# Patient Record
Sex: FEMALE
[De-identification: names, ages, dates, MRNs, and addresses within clinical notes are randomized per-mention and may not be internally consistent; named-entity substitution may affect disease eponyms.]

---

## 2018-03-12 ENCOUNTER — HOSPITAL ENCOUNTER (EMERGENCY)
Dept: HOSPITAL 88 - FSED | Age: 83
Discharge: HOME | End: 2018-03-12
Payer: COMMERCIAL

## 2018-03-12 VITALS — BODY MASS INDEX: 45.16 KG/M2 | WEIGHT: 230 LBS | HEIGHT: 60 IN

## 2018-03-12 VITALS — SYSTOLIC BLOOD PRESSURE: 145 MMHG | DIASTOLIC BLOOD PRESSURE: 75 MMHG

## 2018-03-12 DIAGNOSIS — D64.9: ICD-10-CM

## 2018-03-12 DIAGNOSIS — I10: ICD-10-CM

## 2018-03-12 DIAGNOSIS — J00: ICD-10-CM

## 2018-03-12 DIAGNOSIS — J45.901: ICD-10-CM

## 2018-03-12 DIAGNOSIS — R06.00: Primary | ICD-10-CM

## 2018-03-12 PROCEDURE — 87086 URINE CULTURE/COLONY COUNT: CPT

## 2018-03-12 PROCEDURE — 71046 X-RAY EXAM CHEST 2 VIEWS: CPT

## 2018-03-12 PROCEDURE — 99283 EMERGENCY DEPT VISIT LOW MDM: CPT

## 2018-03-12 PROCEDURE — 81003 URINALYSIS AUTO W/O SCOPE: CPT

## 2018-03-12 PROCEDURE — 84484 ASSAY OF TROPONIN QUANT: CPT

## 2018-03-12 PROCEDURE — 93005 ELECTROCARDIOGRAM TRACING: CPT

## 2018-03-12 PROCEDURE — 82553 CREATINE MB FRACTION: CPT

## 2018-03-12 PROCEDURE — 80053 COMPREHEN METABOLIC PANEL: CPT

## 2018-03-12 PROCEDURE — 83880 ASSAY OF NATRIURETIC PEPTIDE: CPT

## 2018-03-12 PROCEDURE — 85025 COMPLETE CBC W/AUTO DIFF WBC: CPT

## 2018-08-09 ENCOUNTER — HOSPITAL ENCOUNTER (EMERGENCY)
Dept: HOSPITAL 88 - FSED | Age: 83
Discharge: HOME | End: 2018-08-09
Payer: COMMERCIAL

## 2018-08-09 VITALS — BODY MASS INDEX: 30.13 KG/M2 | HEIGHT: 61 IN | WEIGHT: 159.56 LBS

## 2018-08-09 VITALS — DIASTOLIC BLOOD PRESSURE: 66 MMHG | SYSTOLIC BLOOD PRESSURE: 143 MMHG

## 2018-08-09 DIAGNOSIS — R06.00: Primary | ICD-10-CM

## 2018-08-09 DIAGNOSIS — I10: ICD-10-CM

## 2018-08-09 DIAGNOSIS — D50.9: ICD-10-CM

## 2018-08-09 DIAGNOSIS — J45.21: ICD-10-CM

## 2018-08-09 PROCEDURE — 94760 N-INVAS EAR/PLS OXIMETRY 1: CPT

## 2018-08-09 PROCEDURE — 85379 FIBRIN DEGRADATION QUANT: CPT

## 2018-08-09 PROCEDURE — 84484 ASSAY OF TROPONIN QUANT: CPT

## 2018-08-09 PROCEDURE — 71260 CT THORAX DX C+: CPT

## 2018-08-09 PROCEDURE — 82553 CREATINE MB FRACTION: CPT

## 2018-08-09 PROCEDURE — 99284 EMERGENCY DEPT VISIT MOD MDM: CPT

## 2018-08-09 PROCEDURE — 85025 COMPLETE CBC W/AUTO DIFF WBC: CPT

## 2018-08-09 PROCEDURE — 83880 ASSAY OF NATRIURETIC PEPTIDE: CPT

## 2018-08-09 PROCEDURE — 80053 COMPREHEN METABOLIC PANEL: CPT

## 2018-08-09 PROCEDURE — 93005 ELECTROCARDIOGRAM TRACING: CPT

## 2019-06-24 ENCOUNTER — HOSPITAL ENCOUNTER (EMERGENCY)
Dept: HOSPITAL 88 - FSED | Age: 84
Discharge: HOME | End: 2019-06-24
Payer: COMMERCIAL

## 2019-06-24 VITALS — HEIGHT: 61 IN | BODY MASS INDEX: 30.02 KG/M2 | WEIGHT: 159 LBS
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
## 2019-06-24 DIAGNOSIS — M25.512: ICD-10-CM

## 2019-06-24 DIAGNOSIS — M25.552: ICD-10-CM

## 2019-06-24 DIAGNOSIS — M79.622: Primary | ICD-10-CM

## 2019-06-24 DIAGNOSIS — I10: ICD-10-CM

## 2019-06-24 DIAGNOSIS — E78.5: ICD-10-CM

## 2019-06-24 DIAGNOSIS — M15.0: ICD-10-CM

## 2019-06-24 DIAGNOSIS — S42.352A: ICD-10-CM
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
## 2019-06-24 PROCEDURE — 99284 EMERGENCY DEPT VISIT MOD MDM: CPT

## 2019-06-24 NOTE — XMS REPORT
Summary of Care: 10/23/18 - 10/24/18

                             Created on: 2076



JR BANUELOS

External Reference #: [id number]

: 1932

Sex: Female



Demographics







                          Address                   [address] [place]  [address]-

 

                          Home Phone                [phone]

 

                          Preferred Language        Unknown

 

                          Marital Status            

 

                          Judaism Affiliation     Restorationist

 

                          Race                      Other

 

                                        Additional Race(s)  

 

                          Ethnic Group              Unknown





Author







                          Author                    [facility]

 

                          Organization              [facility]

 

                          Address                   Unknown

 

                          Phone                     Unavailable







Encounter





HQ Wilma_andrei(FIN) [id number] Date(s): 10/23/18 - 10/24/18

[facility] [address] [address]-     [phone]
[phone]





Vital Signs





No data available for this section



Problem List







    



              Condition     Effective Dates     Status       Health Status     Informant

 

    



                           Chronic blood loss        Active  



                                         anemia(Confirmed)    

 

    



                           Oral                      Active  



                                         aphthae(Confirmed)    

 

    



                           Benign                    Active  



                                         hypertension(Confirm    



                                         ed)    

 

    



                           Cholelithiasis(Confi      Active  



                                         rmed)    

 

    



                           Chronic                   Active  



                                         anemia(Confirmed)    

 

    



                           Chronic back              Active  



                                         pain(Confirmed)    

 

    



                           Chronic                   Active  



                                         diarrhea(Confirmed)    

 

    



                           Gastritis(Confirmed)      Active  

 

    



                           Mixed                     Active  



                                         hyperlipidemia(Confi    



                                         rmed)    

 

    



                           Liver                     Active  



                                         cyst(Confirmed)    

 

    



                           Liver                     Active  



                                         mass(Confirmed)    

 

    



                           OA                        Active  



                                         (osteoarthritis)(Con    



                                         firmed)    

 

    



                           Osteoporosis(Confirm      Active  



                                         ed)    

 

    



                           Prediabetes(Confirme      Active  



                                         d)    

 

    



                           Recurrent urinary         Active  



                                         tract    



                                         infection(Confirmed)    

 

    



                           Unstable                  Active  



                                         gait(Confirmed)    

 

    



                           UI (urinary               Active  



                                         incontinence)(Confir    



                                         med)    

 

    



                           Weight                    Active  



                                         loss(Confirmed)    







Allergies, Adverse Reactions, Alerts





No Known Medication Allergies



Medications





lisinopril 40 mg oral tablet

40 mg=1 tab, PO, Daily, # 90 tab, 0 Refill(s), Pharmacy: [facility] 

Start Date: 10/23/18

Stop Date: 3/1/19

Status: Discontinued



omeprazole 40 mg oral delayed release capsule

40 mg=1 cap, PO, Daily, # 90 cap, 0 Refill(s), Pharmacy: [facility] 

Start Date: 10/23/18

Stop Date: 18

Status: Discontinued



Results





No data available for this section



Immunizations





Given and Recorded





   



                 Vaccine         Date            Status          Refusal Reason

 

   



                     influenza virus vaccine, inactivated     18             Given 

 

   



                     pneumococcal 13-valent vaccine     18             Given 

 

   



                     hepatitis B adult vaccine     16             Given 

 

   



                     hepatitis B adult vaccine     3/10/16             Given 

 

   



                     hepatitis B adult vaccine     16              Given 

 

   



                     pneumococcal 23-valent vaccine     16              Recorded 







Procedures







    



              Procedure     Date         Related Diagnosis     Body Site     Status

 

    



                     Endoscopy1          18             Completed

 

    



                     Colonoscopy2        8/3/17              Completed

 

    



                     Echocardiogram3     17             Completed

 

    



                     Bone density scan4     17             Completed

 

    



                     Cholecystectomy     2016             Completed

 

    



                     Knee replacement2014                Completed

 

    



                           Hysterectomy              Completed







1mild gastritis and duodenitis without any active bleeding. @ [facility]



2diverticulosis  Dr Hogan



3EF 68%  Mild MR

Dr Velásquez



4Osteoporosis left femoral neck



5left knee



Social History







 



                           Social History Type       Response

 

 



                           Alcohol                   Never

 

 



                           Smoking Status            Never smoker; Exposure to Tobacco Smoke None; Cigarette Smoking

 Last 365



                                         Days No; Reg Smoking Cessation Counseling No



                                         entered on: 3/1/19







Assessment and Plan





No data available for this section

## 2019-06-24 NOTE — XMS REPORT
Summary of Care: 18 - 18

                             Created on: 2122



JR BANUELOS

External Reference #: [id number]

: 1932

Sex: Female



Demographics







                          Address                   [address] [place]  [address]-

 

                          Home Phone                [phone]

 

                          Preferred Language        Unknown

 

                          Marital Status            

 

                          Mosque Affiliation     Restorationism

 

                          Race                      Other

 

                                        Additional Race(s)  

 

                          Ethnic Group              Unknown





Author







                          Author                    [facility]

 

                          Organization              [facility]

 

                          Address                   Unknown

 

                          Phone                     Unavailable







Encounter





HQ Debra(FIN) [id number] Date(s): 18 - 18

[facility] [address] [place] [address]-     [phone]
[phone]

Encounter Diagnosis

Shortness of breath (Final) - 18

Cardiomegaly (Final) - 

Atherosclerotic heart disease of native coronary artery without angina pectoris 
(Final) - 

Hepatomegaly, not elsewhere classified (Final) - 

Cyst of kidney, acquired (Final) - 

Atelectasis (Final) - 

Other kyphosis, thoracic region (Final) - 

Atherosclerosis of aorta (Final) - 

Discharge Disposition: Home or Self Care

Attending Physician: Grayson Blanchard MD

Referring Physician: Grayson Blanchard MD





Vital Signs





No data available for this section



Problem List







    



              Condition     Effective Dates     Status       Health Status     Informant

 

    



                           Chronic blood loss        Active  



                                         anemia(Confirmed)    

 

    



                           Oral                      Active  



                                         aphthae(Confirmed)    

 

    



                           Benign                    Active  



                                         hypertension(Confirm    



                                         ed)    

 

    



                           Cholelithiasis(Confi      Active  



                                         rmed)    

 

    



                           Chronic                   Active  



                                         anemia(Confirmed)    

 

    



                           Chronic back              Active  



                                         pain(Confirmed)    

 

    



                           Chronic                   Active  



                                         diarrhea(Confirmed)    

 

    



                           Gastritis(Confirmed)      Active  

 

    



                           Mixed                     Active  



                                         hyperlipidemia(Confi    



                                         rmed)    

 

    



                           Liver                     Active  



                                         cyst(Confirmed)    

 

    



                           Liver                     Active  



                                         mass(Confirmed)    

 

    



                           OA                        Active  



                                         (osteoarthritis)(Con    



                                         firmed)    

 

    



                           Osteoporosis(Confirm      Active  



                                         ed)    

 

    



                           Prediabetes(Confirme      Active  



                                         d)    

 

    



                           Recurrent urinary         Active  



                                         tract    



                                         infection(Confirmed)    

 

    



                           Unstable                  Active  



                                         gait(Confirmed)    

 

    



                           UI (urinary               Active  



                                         incontinence)(Confir    



                                         med)    

 

    



                           Weight                    Active  



                                         loss(Confirmed)    







Allergies, Adverse Reactions, Alerts







   



                 Substance       Reaction        Severity        Status

 

   



                           NKDA                      Active







Medications





Omnipaque 300 injectable solution

100 mL, Route: IVP, Drug Form: SOLN, Dosing Weight 72.727, kg, ONCALL, GFR > 45 
mL/min, Start date: 18 12:00:00 CDT, Duration: 1 doses or times

Notes: (Same as:Omnipaque 300).WASTE: F/P - Black; E - [employer]

Start Date: 18

Stop Date: 18

Status: Deleted



Results





CHEM PANEL





 



                           Most recent to            1



                                         oldest [Reference 



                                         Range]: 

 

 



                           eGFR                      79 mL/min/1.73m2 1



                                         *NA*



                                         (18 11:31 AM)

 

 



                           POC Creatinine            0.7 mg/dL



                           [0.5-1.4 mg/dL]           (18 11:31 AM)







1Result Comment: The eGFR is calculated using the CKD-EPI formula. In most 
young, healthy individuals the eGFR will be >90 mL/min/1.73m2. The eGFR declines
with age. An eGFR of 60-89 may be normal in some populations, particularly the 
elderly, for whom the CKD-EPI formula has not been extensively validated. Use of
the eGFR is not recommended in the following populations:



Individuals with unstable creatinine concentrations, including pregnant patients
and those with serious co-morbid conditions.



Patients with extremes in muscle mass or diet. 



The data above are obtained from the National Kidney Disease Education Program (
NKDEP) which additionally recommends that when the eGFR is used in patients with
extremes of body mass index for purposes of drug dosing, the eGFR should be mul
tiplied by the estimated BMI.



Immunizations





Given and Recorded





   



                 Vaccine         Date            Status          Refusal Reason

 

   



                     influenza virus vaccine, inactivated     18             Given 

 

   



                     pneumococcal 13-valent vaccine     18             Given 

 

   



                     hepatitis B adult vaccine     16             Given 

 

   



                     hepatitis B adult vaccine     3/10/16             Given 

 

   



                     hepatitis B adult vaccine     16              Given 

 

   



                     pneumococcal 23-valent vaccine     16              Recorded 







Procedures







    



              Procedure     Date         Related Diagnosis     Body Site     Status

 

    



                     Endoscopy1          18             Completed

 

    



                     Colonoscopy2        8/3/17              Completed

 

    



                     Echocardiogram3     17             Completed

 

    



                     Bone density scan4     17             Completed

 

    



                     Cholecystectomy     2016             Completed

 

    



                     Knee replacement2014                Completed

 

    



                           Hysterectomy              Completed







1mild gastritis and duodenitis without any active bleeding. @ [facility]



2diverticulosis  Dr Hogan



3EF 68%  Mild MR

Dr Velásquez



4Osteoporosis left femoral neck



5left knee



Social History







 



                           Social History Type       Response

 

 



                           Alcohol                   Never

 

 



                           Smoking Status            Never smoker; Exposure to Tobacco Smoke None; Cigarette Smoking

 Last 365



                                         Days No; Reg Smoking Cessation Counseling No



                                         entered on: 19







Assessment and Plan





No data available for this section

## 2019-06-24 NOTE — XMS REPORT
Summary of Care: 18 - 18

                             Created on: 2081



JR BANUELOS

External Reference #: [id number]

: 1932

Sex: Female



Demographics







                          Address                   [address] [place]  [address]-

 

                          Home Phone                [phone]

 

                          Preferred Language        Unknown

 

                          Marital Status            

 

                          Hindu Affiliation     Roman Catholic

 

                          Race                      Other

 

                                        Additional Race(s)  

 

                          Ethnic Group              Unknown





Author







                          Author                    [facility]

 

                          Organization              [facility]

 

                          Address                   Unknown

 

                          Phone                     Unavailable







Encounter





HQ Debra(FIN) [id number] Date(s): 18 - 18

[facility] [address] [facility], [address] [place] [address] [phone]

Discharge Disposition: Home or Self Care

Attending Physician: Davida Perales MD





Vital Signs







  



                     Most recent to      1                   2



                                         oldest [Reference  



                                         Range]:  

 

  



                           Height                    149.86 cm 



                                         (18 10:55 AM) 

 

  



                           Temperature Oral          97.9 DegF 



                           [96.4-99.1 DegF]          (18 10:55 AM) 

 

  



                     Blood Pressure      129/68 mmHg         160/64 mmHg



                     [/60-90 mmHg]     (18 12:28 PM)     *HI*



                                         (18 10:55 AM)

 

  



                           Respiratory Rate          16 BRMIN 



                           [14-20 BRMIN]             (18 10:55 AM) 

 

  



                           Peripheral Pulse          64 bpm 



                           Rate [ bpm]         (18 10:55 AM) 

 

  



                           Weight                    72.727 kg 



                                         (18 10:55 AM) 

 

  



                           Body Mass Index           32.38 m2 



                                         (18 10:55 AM) 







Problem List







    



              Condition     Effective Dates     Status       Health Status     Informant

 

    



                           Chronic blood loss        Active  



                                         anemia(Confirmed)    

 

    



                           Oral                      Active  



                                         aphthae(Confirmed)    

 

    



                           Benign                    Active  



                                         hypertension(Confirm    



                                         ed)    

 

    



                           Cholelithiasis(Confi      Active  



                                         rmed)    

 

    



                           Chronic                   Active  



                                         anemia(Confirmed)    

 

    



                           Chronic back              Active  



                                         pain(Confirmed)    

 

    



                           Chronic                   Active  



                                         diarrhea(Confirmed)    

 

    



                           Gastritis(Confirmed)      Active  

 

    



                           Mixed                     Active  



                                         hyperlipidemia(Confi    



                                         rmed)    

 

    



                           Liver                     Active  



                                         cyst(Confirmed)    

 

    



                           Liver                     Active  



                                         mass(Confirmed)    

 

    



                           OA                        Active  



                                         (osteoarthritis)(Con    



                                         firmed)    

 

    



                           Osteoporosis(Confirm      Active  



                                         ed)    

 

    



                           Prediabetes(Confirme      Active  



                                         d)    

 

    



                           Recurrent urinary         Active  



                                         tract    



                                         infection(Confirmed)    

 

    



                           Unstable                  Active  



                                         gait(Confirmed)    

 

    



                           UI (urinary               Active  



                                         incontinence)(Confir    



                                         med)    

 

    



                           Weight                    Active  



                                         loss(Confirmed)    







Allergies, Adverse Reactions, Alerts







   



                 Substance       Reaction        Severity        Status

 

   



                           NKDA                      Active







Medications





triamcinolone topical 0.1% cream

1 appl, TOP, BID, PRN Apply to affected area(s), X 14 day, # 60 gm, 0 Refill(s),
Pharmacy: [facility] [id number]

Start Date: 18

Stop Date: 18

Status: Completed



Results





No data available for this section



Immunizations





Given and Recorded





   



                 Vaccine         Date            Status          Refusal Reason

 

   



                     influenza virus vaccine, inactivated     18             Given 

 

   



                     pneumococcal 13-valent vaccine     18             Given 

 

   



                     hepatitis B adult vaccine     16             Given 

 

   



                     hepatitis B adult vaccine     3/10/16             Given 

 

   



                     hepatitis B adult vaccine     16              Given 

 

   



                     pneumococcal 23-valent vaccine     16              Recorded 







Procedures







    



              Procedure     Date         Related Diagnosis     Body Site     Status

 

    



                     Endoscopy1          18             Completed

 

    



                     Colonoscopy2        8/3/17              Completed

 

    



                     Echocardiogram3     17             Completed

 

    



                     Bone density scan4     17             Completed

 

    



                     Cholecystectomy     2016             Completed

 

    



                     Knee replacement2014                Completed

 

    



                           Hysterectomy              Completed







1mild gastritis and duodenitis without any active bleeding. @ [facility].



2diverticulosis  Dr Hogan



3EF 68%  Mild MR

Dr Velásquez



4Osteoporosis left femoral neck



5left knee



Social History







 



                           Social History Type       Response

 

 



                           Alcohol                   Never

 

 



                           Smoking Status            Never smoker; Exposure to Tobacco Smoke None; Cigarette Smoking

 Last 365



                                         Days No; Reg Smoking Cessation Counseling No



                                         entered on: 19







Assessment and Plan





No data available for this section

## 2019-06-24 NOTE — DIAGNOSTIC IMAGING REPORT
Exam:  Left hip and pelvis



History: trauma, pain



Comparison: None.



Findings: The bones are diffusely osteopenic.



No acute, displaced fracture or dislocation. Femoral head projects

appropriately over the acetabulum. Symmetric moderate degenerative arthrosis of

the hips.



Sacroiliac joints are intact. Degenerative disc changes and facet arthropathy

of the partially visualized lumbar spine. Vertebroplasty cement within the

partially visualized L3 vertebral body. Multiple pelvic phleboliths.

Atherosclerotic vascular calcifications.



Impression:



Diffuse osteopenia without acute osseous abnormality.



Symmetric degenerative arthrosis of the hips.



Signed by: Dr. Gautam Yousif M.D. on 6/24/2019 4:48 PM

## 2019-06-24 NOTE — XMS REPORT
Summary of Care: 4/15/19 - 19

                             Created on: 2050



JR BANUELOS

External Reference #: [id number]

: 1932

Sex: Female



Demographics







                          Address                   [address] [place]  [address]-

 

                          Home Phone                [phone]

 

                          Preferred Language        Unknown

 

                          Marital Status            

 

                          Oriental orthodox Affiliation     Religion

 

                          Race                      Other

 

                                        Additional Race(s)  

 

                          Ethnic Group              Unknown





Author







                          Author                    [facility]

 

                          Organization              [facility]

 

                          Address                   Unknown

 

                          Phone                     Unavailable







Encounter





HQ Lilianar_andrei(FIN) [id number] Date(s): 4/15/19 - 19

[facility] [address] [address]-     [phone]
[phone]





Vital Signs





No data available for this section



Problem List







    



              Condition     Effective Dates     Status       Health Status     Informant

 

    



                           Chronic blood loss        Active  



                                         anemia(Confirmed)    

 

    



                           Oral                      Active  



                                         aphthae(Confirmed)    

 

    



                           Benign                    Active  



                                         hypertension(Confirm    



                                         ed)    

 

    



                           Cholelithiasis(Confi      Active  



                                         rmed)    

 

    



                           Chronic                   Active  



                                         anemia(Confirmed)    

 

    



                           Chronic back              Active  



                                         pain(Confirmed)    

 

    



                           Chronic                   Active  



                                         diarrhea(Confirmed)    

 

    



                           Gastritis(Confirmed)      Active  

 

    



                           Mixed                     Active  



                                         hyperlipidemia(Confi    



                                         rmed)    

 

    



                           Liver                     Active  



                                         cyst(Confirmed)    

 

    



                           Liver                     Active  



                                         mass(Confirmed)    

 

    



                           OA                        Active  



                                         (osteoarthritis)(Con    



                                         firmed)    

 

    



                           Osteoporosis(Confirm      Active  



                                         ed)    

 

    



                           Prediabetes(Confirme      Active  



                                         d)    

 

    



                           Recurrent urinary         Active  



                                         tract    



                                         infection(Confirmed)    

 

    



                           Unstable                  Active  



                                         gait(Confirmed)    

 

    



                           UI (urinary               Active  



                                         incontinence)(Confir    



                                         med)    

 

    



                           Weight                    Active  



                                         loss(Confirmed)    







Allergies, Adverse Reactions, Alerts







   



                 Substance       Reaction        Severity        Status

 

   



                           NKDA                      Active







Medications





lovastatin 10 mg oral tablet

10 mg=1 tab, PO, Bedtime, # 90 tab, 1 Refill(s), Pharmacy: [facility] 
[id number]

Start Date: 4/15/19

Status: Ordered



Results





No data available for this section



Immunizations





Given and Recorded





   



                 Vaccine         Date            Status          Refusal Reason

 

   



                     influenza virus vaccine, inactivated     18             Given 

 

   



                     pneumococcal 13-valent vaccine     18             Given 

 

   



                     hepatitis B adult vaccine     16             Given 

 

   



                     hepatitis B adult vaccine     3/10/16             Given 

 

   



                     hepatitis B adult vaccine     16              Given 

 

   



                     pneumococcal 23-valent vaccine     16              Recorded 







Procedures







    



              Procedure     Date         Related Diagnosis     Body Site     Status

 

    



                     Endoscopy1          18             Completed

 

    



                     Colonoscopy2        8/3/17              Completed

 

    



                     Echocardiogram3     17             Completed

 

    



                     Bone density scan4     17             Completed

 

    



                     Cholecystectomy     2016             Completed

 

    



                     Knee replacement5                     Completed

 

    



                           Hysterectomy              Completed







1mild gastritis and duodenitis without any active bleeding. @ [facility]patient [facility].



2diverticulosis  Dr Hogan



3EF 68%  Mild MR

Dr Velásquez



4Osteoporosis left femoral neck



5left knee



Social History







 



                           Social History Type       Response

 

 



                           Alcohol                   Never

 

 



                           Smoking Status            Never smoker; Exposure to Tobacco Smoke None; Cigarette Smoking

 Last 365



                                         Days No; Reg Smoking Cessation Counseling No



                                         entered on: 3/1/19







Assessment and Plan





No data available for this section

## 2019-06-24 NOTE — XMS REPORT
Summary of Care: 18 - 18

                             Created on: 2029



JR BANUELOS

External Reference #: [id number]

: 1932

Sex: Female



Demographics







                          Address                   [address] [place]  [address]-

 

                          Home Phone                [phone]

 

                          Preferred Language        Unknown

 

                          Marital Status            

 

                          Alevism Affiliation     Moravian

 

                          Race                      Other

 

                                        Additional Race(s)  

 

                          Ethnic Group              Unknown





Author







                          Author                    [facility]

 

                          Organization              [facility]

 

                          Address                   Unknown

 

                          Phone                     Unavailable







Encounter





HQ Lilianar_andrei(FIN) [id number] Date(s): 18 - 18

[facility] [address] [facility], [address] [place] [address] [phone]





Vital Signs





No data available for this section



Problem List







    



              Condition     Effective Dates     Status       Health Status     Informant

 

    



                           Chronic blood loss        Active  



                                         anemia(Confirmed)    

 

    



                           Oral                      Active  



                                         aphthae(Confirmed)    

 

    



                           Benign                    Active  



                                         hypertension(Confirm    



                                         ed)    

 

    



                           Cholelithiasis(Confi      Active  



                                         rmed)    

 

    



                           Chronic                   Active  



                                         anemia(Confirmed)    

 

    



                           Chronic back              Active  



                                         pain(Confirmed)    

 

    



                           Chronic                   Active  



                                         diarrhea(Confirmed)    

 

    



                           Gastritis(Confirmed)      Active  

 

    



                           Mixed                     Active  



                                         hyperlipidemia(Confi    



                                         rmed)    

 

    



                           Liver                     Active  



                                         cyst(Confirmed)    

 

    



                           Liver                     Active  



                                         mass(Confirmed)    

 

    



                           OA                        Active  



                                         (osteoarthritis)(Con    



                                         firmed)    

 

    



                           Osteoporosis(Confirm      Active  



                                         ed)    

 

    



                           Prediabetes(Confirme      Active  



                                         d)    

 

    



                           Recurrent urinary         Active  



                                         tract    



                                         infection(Confirmed)    

 

    



                           Unstable                  Active  



                                         gait(Confirmed)    

 

    



                           UI (urinary               Active  



                                         incontinence)(Confir    



                                         med)    

 

    



                           Weight                    Active  



                                         loss(Confirmed)    







Allergies, Adverse Reactions, Alerts







   



                 Substance       Reaction        Severity        Status

 

   



                           NKDA                      Active







Medications





alendronate 70 mg oral tablet

70 mg=1 tab, PO, Q7D, # 12 tab, 0 Refill(s)

Start Date: 18

Stop Date: 18

Status: Discontinued



Results





No data available for this section



Immunizations





Given and Recorded





   



                 Vaccine         Date            Status          Refusal Reason

 

   



                     influenza virus vaccine, inactivated     18             Given 

 

   



                     pneumococcal 13-valent vaccine     18             Given 

 

   



                     hepatitis B adult vaccine     16             Given 

 

   



                     hepatitis B adult vaccine     3/10/16             Given 

 

   



                     hepatitis B adult vaccine     16              Given 

 

   



                     pneumococcal 23-valent vaccine     16              Recorded 







Procedures







    



              Procedure     Date         Related Diagnosis     Body Site     Status

 

    



                     Endoscopy1          18             Completed

 

    



                     Colonoscopy2        8/3/17              Completed

 

    



                     Echocardiogram3     17             Completed

 

    



                     Bone density scan4     17             Completed

 

    



                     Cholecystectomy     2016             Completed

 

    



                     Knee replacement5                     Completed

 

    



                           Hysterectomy              Completed







1mild gastritis and duodenitis without any active bleeding. @ [facility]patient [facility].



2diverticulosis  Dr Hogan



3EF 68%  Mild MR

Dr Velásquez



4Osteoporosis left femoral neck



5left knee



Social History







 



                           Social History Type       Response

 

 



                           Alcohol                   Never

 

 



                           Smoking Status            Never smoker; Exposure to Tobacco Smoke None; Cigarette Smoking

 Last 365



                                         Days No; Reg Smoking Cessation Counseling No



                                         entered on: 3/1/19







Assessment and Plan





No data available for this section

## 2019-06-24 NOTE — XMS REPORT
Summary of Care: 10/5/18 - 10/5/18

                             Created on: 2023



JR BANUELOS

External Reference #: [id number]

: 1932

Sex: Female



Demographics







                          Address                   [address] [place]  [address]-

 

                          Home Phone                [phone]

 

                          Preferred Language        Unknown

 

                          Marital Status            

 

                          Congregation Affiliation     Rastafarian

 

                          Race                      Other

 

                                        Additional Race(s)  

 

                          Ethnic Group              Unknown





Author







                          Author                    [facility]

 

                          Organization              [facility]

 

                          Address                   Unknown

 

                          Phone                     Unavailable







Encounter





JUWAN Richardson(FIN) [id number] Date(s): 10/5/18 - 10/5/18

[facility] [address] [address]     [phone]
[phone]

Discharge Disposition: Home or Self Care

Attending Physician: Davida Perales MD





Vital Signs







 



                           Most recent to            1



                                         oldest [Reference 



                                         Range]: 

 

 



                           Height                    152.4 cm



                                         (10/5/18 10:04 AM)

 

 



                           Temperature Oral          96.9 DegF



                           [96.4-99.1 DegF]          (10/5/18 10:04 AM)

 

 



                           Blood Pressure            137/62 mmHg



                           [/60-90 mmHg]       (10/5/18 10:04 AM)

 

 



                           Respiratory Rate          16 BRMIN



                           [14-20 BRMIN]             (10/5/18 10:04 AM)

 

 



                           Peripheral Pulse          68 bpm



                           Rate [ bpm]         (10/5/18 10:04 AM)

 

 



                           Weight                    70.909 kg



                                         (10/5/18 10:04 AM)

 

 



                           Body Mass Index           30.53 m2



                                         (10/5/18 10:04 AM)







Problem List







    



              Condition     Effective Dates     Status       Health Status     Informant

 

    



                           Chronic blood loss        Active  



                                         anemia(Confirmed)    

 

    



                           Oral                      Active  



                                         aphthae(Confirmed)    

 

    



                           Benign                    Active  



                                         hypertension(Confirm    



                                         ed)    

 

    



                           Cholelithiasis(Confi      Active  



                                         rmed)    

 

    



                           Chronic                   Active  



                                         anemia(Confirmed)    

 

    



                           Chronic back              Active  



                                         pain(Confirmed)    

 

    



                           Chronic                   Active  



                                         diarrhea(Confirmed)    

 

    



                           Gastritis(Confirmed)      Active  

 

    



                           Mixed                     Active  



                                         hyperlipidemia(Confi    



                                         rmed)    

 

    



                           Liver                     Active  



                                         cyst(Confirmed)    

 

    



                           Liver                     Active  



                                         mass(Confirmed)    

 

    



                           OA                        Active  



                                         (osteoarthritis)(Con    



                                         firmed)    

 

    



                           Osteoporosis(Confirm      Active  



                                         ed)    

 

    



                           Prediabetes(Confirme      Active  



                                         d)    

 

    



                           Recurrent urinary         Active  



                                         tract    



                                         infection(Confirmed)    

 

    



                           Unstable                  Active  



                                         gait(Confirmed)    

 

    



                           UI (urinary               Active  



                                         incontinence)(Confir    



                                         med)    

 

    



                           Weight                    Active  



                                         loss(Confirmed)    







Allergies, Adverse Reactions, Alerts







   



                 Substance       Reaction        Severity        Status

 

   



                           NKDA                      Active







Medications





oxybutynin 10 mg oral tablet, extended release

10 mg=1 tab, PO, Daily, # 90 tab, 1 Refill(s), Pharmacy: [id number]
[id number]

Start Date: 10/5/18

Status: Ordered



Results





No data available for this section



Immunizations





Given and Recorded





   



                 Vaccine         Date            Status          Refusal Reason

 

   



                     influenza virus vaccine, inactivated     18             Given 

 

   



                     pneumococcal 13-valent vaccine     18             Given 

 

   



                     hepatitis B adult vaccine     16             Given 

 

   



                     hepatitis B adult vaccine     3/10/16             Given 

 

   



                     hepatitis B adult vaccine     16              Given 

 

   



                     pneumococcal 23-valent vaccine     16              Recorded 







Procedures







    



              Procedure     Date         Related Diagnosis     Body Site     Status

 

    



                     Endoscopy1          18             Completed

 

    



                     Colonoscopy2        8/3/17              Completed

 

    



                     Echocardiogram3     17             Completed

 

    



                     Bone density scan4     17             Completed

 

    



                     Cholecystectomy     2016             Completed

 

    



                     Knee replacement2014                Completed

 

    



                           Hysterectomy              Completed







1mild gastritis and duodenitis without any active bleeding. @ [facility]patient [facility].



2diverticulosis  Dr Hogan



3EF 68%  Mild MR

Dr Velásquez



4Osteoporosis left femoral neck



5left knee



Social History







 



                           Social History Type       Response

 

 



                           Alcohol                   Never

 

 



                           Smoking Status            Never smoker; Exposure to Tobacco Smoke None; Cigarette Smoking

 Last 365



                                         Days No; Reg Smoking Cessation Counseling No



                                         entered on: 3/1/19







Assessment and Plan





No data available for this section

## 2019-06-24 NOTE — DIAGNOSTIC IMAGING REPORT
Exam:  Left wrist 3 views



History:  Trauma



Comparison: None.



Findings: The bones are diffusely osteopenic. No acute, displaced fracture or

dislocation. Appropriate alignment between the distal radius, lunate, and

capitate is maintained on the lateral radiograph. Radiocarpal cartilaginous

calcifications. Soft tissues otherwise unremarkable.



Impression:



Diffuse osteopenia without acute osseous abnormality.



Articular cartilaginous calcifications. 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





Signed by: Dr. Gautam Yousif M.D. on 6/24/2019 4:42 PM

## 2019-06-24 NOTE — XMS REPORT
Summary of Care: 18 - 18

                             Created on: 2089



JR BANUELOS

External Reference #: [id number]

: 1932

Sex: Female



Demographics







                          Address                   [address] [place]  [address]-

 

                          Home Phone                [phone]

 

                          Preferred Language        Unknown

 

                          Marital Status            

 

                          Jewish Affiliation     Rastafarian

 

                          Race                      Other

 

                                        Additional Race(s)  

 

                          Ethnic Group              Unknown





Author







                          Author                    [facility]

 

                          Organization              [facility]

 

                          Address                   Unknown

 

                          Phone                     Unavailable







Encounter





JUWAN Richardson(JUANITO) [id number] Date(s): 18 - 18

[facility] [address] [place] [address]-     [phone]
[phone]

Encounter Diagnosis

Melena (Final) - 18

Acute posthemorrhagic anemia (Final) - 

Gastritis, unspecified, without bleeding (Final) - 

Duodenitis without bleeding (Final) - 

Noninfective gastroenteritis and colitis, unspecified (Final) - 

Dysphagia, unspecified (Final) - 

Essential (primary) hypertension (Final) - 

Encounter for immunization (Final) - 

Mixed hyperlipidemia (Final) - 

Unspecified osteoarthritis, unspecified site (Final) - 

Age-related osteoporosis without current pathological fracture (Final) - 

Prediabetes (Final) - 

Unspecified asthma, uncomplicated (Final) - 

Iron deficiency anemia, unspecified (Final) - 

Other specified diseases of liver (Final) - 

Unspecified urinary incontinence (Final) - 

Other chronic pain (Final) - 

Dorsalgia, unspecified (Final) - 

Discharge Disposition: Home or Self Care

Attending Physician: Wolf Vargas MD

Admitting Physician: Wolf Vargas MD





Vital Signs







                    1                   2                   3



                                         Most recent to   



                                         oldest [Reference   



                                         Range]:   

 

                                        152.4 cm 

                          (18 1:25 PM)         152.4 cm 

                          (18 10:03 AM)         



                                         Height   

 

                                        98 DegF 

                          (18 4:25 PM)         98.5 DegF 

                          (18 11:02 AM)        98.4 DegF 

                                        (18 7:47 AM)



                                         Temperature Oral   



                                         [96.4-99.1 DegF]   

 

                                        164/73 mmHg 

*HI*

                          (18 4:25 PM)         154/64 mmHg 

*HI*

                          (18 11:02 AM)        153/68 mmHg 

*HI*

                                        (18 7:47 AM)



                                         Blood Pressure   



                                         [/60-90 mmHg]   

 

                                        18 BRMIN 

                          (18 4:25 PM)         18 BRMIN 

                          (18 11:02 AM)        18 BRMIN 

                                        (18 7:47 AM)



                                         Respiratory Rate   



                                         [14-20 BRMIN]   

 

                                        78 bpm 

                          (18 4:25 PM)         68 bpm 

                          (18 11:02 AM)        65 bpm 

                                        (18 7:47 AM)



                                         Peripheral Pulse   



                                         Rate [ bpm]   

 

                                        70 kg 

                          (18 1:25 PM)         70 kg 

                          (18 10:03 AM)         



                                         Weight   

 

                                        30.14 m2 

                          (18 1:25 PM)         30.14 m2 

                          (18 10:03 AM)         



                                         Body Mass Index   







Problem List







    



              Condition     Effective Dates     Status       Health Status     Informant

 

    



                           Chronic blood loss        Active  



                                         anemia(Confirmed)    

 

    



                           Oral                      Active  



                                         aphthae(Confirmed)    

 

    



                           Benign                    Active  



                                         hypertension(Confirm    



                                         ed)    

 

    



                           Cholelithiasis(Confi      Active  



                                         rmed)    

 

    



                           Chronic                   Active  



                                         anemia(Confirmed)    

 

    



                           Chronic back              Active  



                                         pain(Confirmed)    

 

    



                           Chronic                   Active  



                                         diarrhea(Confirmed)    

 

    



                           Gastritis(Confirmed)      Active  

 

    



                           Mixed                     Active  



                                         hyperlipidemia(Confi    



                                         rmed)    

 

    



                           Liver                     Active  



                                         cyst(Confirmed)    

 

    



                           Liver                     Active  



                                         mass(Confirmed)    

 

    



                           OA                        Active  



                                         (osteoarthritis)(Con    



                                         firmed)    

 

    



                           Osteoporosis(Confirm      Active  



                                         ed)    

 

    



                           Prediabetes(Confirme      Active  



                                         d)    

 

    



                           Recurrent urinary         Active  



                                         tract    



                                         infection(Confirmed)    

 

    



                           Unstable                  Active  



                                         gait(Confirmed)    

 

    



                           UI (urinary               Active  



                                         incontinence)(Confir    



                                         med)    

 

    



                           Weight                    Active  



                                         loss(Confirmed)    







Allergies, Adverse Reactions, Alerts







   



                 Substance       Reaction        Severity        Status

 

   



                           NKDA                      Active







Medications





Chloraseptic 1.4% spray

1 spray, Route: TOP, Daily, Drug form: SPRY, PRN Sore Throat, Start date:  14:00:00 CDT, Duration: 30 day, Stop date: 10/28/18 13:59:00 CDT

Notes: WASTE: F/P - Black; E - Municipal Trash Bin

Start Date: 18

Stop Date: 18

Status: Discontinued



Ferrlecit + Sodium Chloride 0.9% IV 90 mL

125 mg, 10 mL, Route: IVPB, Drug form: INJ, ONCE, Dosing Weight 70, kg, Start da
te: 18 13:35:00 CDT, Stop date: 18 13:35:00 CDT

Notes: "Limited stability.  Use immediately after admixture""Limited stability. 
Use immediately after admixture""Limited stability.  Use immediately after admi
xture"PROTECT FROM LIGHT(Same as: Ferrlecit)  *** MEDICATION WASTE ***Product Si
ze:  62.5 mgProduct Wasted:  ___ mg

Start Date: 18

Stop Date: 18

Status: Completed



hydrALAZINE

10 mg, 0.5 mL, Route: IV, Drug form: INJ, ONCE, Dosing Weight 70, kg, PRN Elevat
ed BP, Start date: 18 19:20:00 CDT,  Dosing

Notes: (Same as: Apresoline)Push over 5 minutes

Start Date: 18

Stop Date: 18

Status: Completed



Lasix

20 mg, 2 mL, Route: IVP, Drug form: INJ, ONCE, Dosing Weight 70, kg, Start date:
18 22:00:00 CDT, Stop date: 18 22:00:00 CDT

Notes: (Same as: Lasix)

Start Date: 18

Stop Date: 18

Status: Completed



lisinopril

40 mg, PO, Daily, 0 Refill(s)

Start Date: 18

Stop Date: 3/1/19

Status: Discontinued



ondansetron

4 mg, 2 mL, Route: IVP, Drug form: INJ, ONCE, Dosing Weight 70, kg, PRN Nausea &
Vomiting, Start date: 18 13:03:00 CDT

Notes: (Same as: Zofran)  *** MEDICATION WASTE ***Product Size:  4 mgProduct Was
lita:  ___ mg

Start Date: 18

Stop Date: 18

Status: Discontinued



pantoprazole

40 mg, Route: IVP, ONCE, Dosing Weight 70, kg, Priority: STAT, Start date:  10:33:00 CDT, Stop date: 18 10:33:00 CDT

Start Date: 18

Stop Date: 18

Status: Completed



pantoprazole

40 mg, Route: IVP, Drug form: INJ, Q12H, Dosing Weight 70, kg, Start date:  21:00:00 CDT, Duration: 30 day, Stop date: 10/27/18 9:00:00 CDT

Start Date: 18

Stop Date: 18

Status: Discontinued



pantoprazole 80 mg + Sodium Chloride 0.9%  mL

100 mL, Rate: 10 ml/hr, Infuse over: 10 hr, Route: IV, Dosing Weight 70 kg, Tota
l Volume: 100, Start date: 18 10:33:00 CDT, Duration: 369 minutes, Stop da
te: 18 19:30:00 CDT, 1.75, m2

Notes: do not load in pyxis

Start Date: 18

Stop Date: 18

Status: Completed



Saline Flush 0.9%

10 mL, Route: IVP, Drug Form: INJ, Dosing Weight 70, kg, PRN, PRN Line Flush, St
art date: 18 10:13:00 CDT, Duration: 30 day, Stop date: 10/27/18 10:12:00 
CDT

Notes: (Same as: BD Posiflush)

Start Date: 18

Stop Date: 18

Status: Discontinued



Saline Flush 0.9%

10 mL, Route: IVP, Drug Form: INJ, Dosing Weight 70, kg, PRN, PRN Line Flush, St
art date: 18 10:33:00 CDT, Duration: 30 day, Stop date: 10/27/18 10:32:00 
CDT

Notes: (Same as: BD Posiflush)

Start Date: 18

Stop Date: 18

Status: Discontinued



Sodium Chloride 0.9% (Bolus) IV

500 mL, Infuse Over: 1 hr, Route: IV, ONCE, Priority: STAT, Dosing Weight 70 kg,
Start date: 18 10:33:00 CDT, Stop date: 18 10:33:00 CDT

Start Date: 18

Stop Date: 18

Status: Completed



Sodium Chloride 0.9% (titrate) 250 mL

250 mL, Rate: To prime line and flush remaining blood products., Dosing Weight 7
0, kg, Route: IV, Total Volume: 250, Start Date: 18 10:13:00 CDT, Duration
: 30 day, Stop date: 10/27/18 10:12:00 CDT, Replace Every: 24 hr

Start Date: 18

Stop Date: 18

Status: Discontinued



Sodium Chloride 0.9% IV 1,000 mL

1,000 mL, Rate: 25 ml/hr, Infuse over: 40 hr, Route: IV, Dosing Weight 70 kg, To
alvino Volume: 1,000, Start date: 18 16:18:00 CDT, Duration: 30 day, Stop abdias
e: 10/27/18 16:17:00 CDT, 1.75, m2

Start Date: 18

Stop Date: 18

Status: Discontinued



sucralfate

1 gm, 1 tab, Route: PO, Drug form: TAB, QID, Dosing Weight 70, kg, Start date: 0
18 17:00:00 CDT, Duration: 30 day, Stop date: 10/28/18 13:00:00 CDT

Notes: May interfere w/enteral feeds -  Take 1 hr before or 2 hr after antacids,
dairy pdt, meals & minerals -  On empty stomach.For patients unable to swallow 
tablet, dissolve in 10mL - 30mL of water or juice and stir before giving.  (Same
As: Carafate)

Start Date: 18

Stop Date: 18

Status: Discontinued



sucralfate 1 g oral tablet

1 gm=1 tab, PO, QID, # 120 tab, 0 Refill(s), Pharmacy: [id number]
3

Start Date: 18

Stop Date: 10/28/18

Status: Ordered



Results





BLOOD BANK RESULTS





                    1                   2                   3



                                         Most recent to   



                                         oldest [Reference   



                                         Range]:   

 

                                        A NEG 

*Unknown*

                    (18 10:32 AM)                          



                                         ABO/Rh   

 

                                        Negative 

                    (18 10:32 AM)                          



                                         Antibody Scrn   

 

                                        Product available 1

                    (18 11:55 AM)                          



                                         RBC product   







1Result Comment: 2018 12:19  [id number]

notified Manda



ELECTROLYTES





                    1                   2                   3



                                         Most recent to   



                                         oldest [Reference   



                                         Range]:   

 

                                        144 mEq/L 

                    (18 10:32 AM)                          



                                         Sodium Lvl [135-145   



                                         mEq/L]   

 

                                        4.2 mEq/L 

                    (18 10:32 AM)                          



                                         Potassium Lvl   



                                         [3.5-5.1 mEq/L]   

 

                                        113 mEq/L 

*HI*

                    (18 10:32 AM)                          



                                         Chloride Lvl [   



                                         mEq/L]   

 

                                        20 mEq/L 

*LOW*

                    (18 10:32 AM)                          



                                         CO2 [24-32 mEq/L]   

 

                                        15.2 mEq/L 

                    (18 10:32 AM)                          



                                         AGAP [10.0-20.0   



                                         mEq/L]   







CHEM PANEL





                    1                   2                   3



                                         Most recent to   



                                         oldest [Reference   



                                         Range]:   

 

                                        0.85 mg/dL 

                    (18 10:32 AM)                          



                                         Creatinine Lvl   



                                         [0.50-1.40 mg/dL]   

 

                                        62 mL/min/1.73m2 1

*NA*

                    (18 10:32 AM)                          



                                         eGFR   

 

                                        26 mg/dL 

*HI*

                    (18 10:32 AM)                          



                                         BUN [7-22 mg/dL]   

 

                                        31 

*HI*

                    (18 10:32 AM)                          



                                         B/C Ratio [6-25]   

 

                                        107 mg/dL 

*HI*

                    (18 10:32 AM)                          



                                         Glucose Lvl [70-99   



                                         mg/dL]   

 

                                        7.0 g/dL 

                    (18 10:32 AM)                          



                                         Total Protein   



                                         [6.4-8.4 g/dL]   

 

                                        3.4 g/dL 

*LOW*

                    (18 10:32 AM)                          



                                         Albumin Lvl [3.5-5.0   



                                         g/dL]   

 

                                        3.6 g/dL 

                    (18 10:32 AM)                          



                                         Globulin [2.7-4.2   



                                         g/dL]   

 

                                        0.9 

                    (18 10:32 AM)                          



                                         A/G Ratio [0.7-1.6]   

 

                                        8.6 mg/dL 

                    (18 10:32 AM)                          



                                         Calcium Lvl   



                                         [8.5-10.5 mg/dL]   

 

                                        17 unit/L 

                    (18 10:32 AM)                          



                                         ALT [0-65 unit/L]   

 

                                        25 unit/L 

                    (18 10:32 AM)                          



                                         AST [0-37 unit/L]   

 

                                        47 unit/L 

                    (18 10:32 AM)                          



                                         Alk Phos [   



                                         unit/L]   

 

                                        0.3 mg/dL 

                    (18 10:32 AM)                          



                                         Bili Total [0.2-1.3   



                                         mg/dL]   







1Result Comment: The eGFR is calculated using the CKD-EPI formula. In most 
young, healthy individuals the eGFR will be >90 mL/min/1.73m2. The eGFR declines
with age. An eGFR of 60-89 may be normal in some populations, particularly the 
elderly, for whom the CKD-EPI formula has not been extensively validated. Use of
the eGFR is not recommended in the following populations:



Individuals with unstable creatinine concentrations, including pregnant patients
and those with serious co-morbid conditions.



Patients with extremes in muscle mass or diet. 



The data above are obtained from the National Kid[employer] Disease Education Program (
NKDEP) which additionally recommends that when the eGFR is used in patients with
extremes of body mass index for purposes of drug dosing, the eGFR should be mul
tiplied by the estimated BMI.



CARDIAC ENZYMES





                    1                   2                   3



                                         Most recent to   



                                         oldest [Reference   



                                         Range]:   

 

                                        104 unit/L 

                    (18 10:32 AM)                          



                                         Total CK [   



                                         unit/L]   

 

                                        <0.02 ng/mL 

                    (18 10:32 AM)                          



                                         Troponin-I   



                                         [0.00-0.40 ng/mL]   







URINE AND STOOL





                    1                   2                   3



                                         Most recent to   



                                         oldest [Reference   



                                         Range]:   

 

                                        Clear 

                    (18 10:32 AM)                          



                                         UA Turbidity [Clear]   

 

                                        Yellow 

*NA*

                    (18 10:32 AM)                          



                                         UA Color [Yellow]   

 

                                        5.0 

                    (18 10:32 AM)                          



                                         UA pH [5.0-8.0]   

 

                                        1.020 

                    (18 10:32 AM)                          



                                         UA Spec Grav   



                                         [<=1.030]   

 

                                        Negative mg/dL 

*NA*

                    (18 10:32 AM)                          



                                         UA Glucose [Negative   



                                         mg/dL]   

 

                                        Negative 

                    (18 10:32 AM)                          



                                         UA Blood [Negative]   

 

                                        Negative mg/dL 

*NA*

                    (18 10:32 AM)                          



                                         UA Ketones [Negative   



                                         mg/dL]   

 

                                        Negative mg/dL 

                    (18 10:32 AM)                          



                                         UA Protein [Negative   



                                         mg/dL]   

 

                                        <=1.0 mg/dL 

*NA*

                    (18 10:32 AM)                          



                                         UA Urobilinogen   



                                         [0.1-1.0 mg/dL]   

 

                                        Negative 

*NA*

                    (18 10:32 AM)                          



                                         UA Bili [Negative]   

 

                                        Large 

*ABN*

                    (18 10:32 AM)                          



                                         UA Leuk Est   



                                         [Negative]   

 

                                        Negative 

                    (18 10:32 AM)                          



                                         UA Nitrite   



                                         [Negative]   

 

                                        6 /HPF 

*HI*

                    (18 10:32 AM)                          



                                         UA WBC [0-5 /HPF]   

 

                                        3 /HPF 

*HI*

                    (18 10:32 AM)                          



                                         UA RBC [0-2 /HPF]   

 

                                        Occasional /LPF 

*NA*

                    (18 10:32 AM)                          



                                         UA Sq Epi [Few /LPF]   

 

                                        Few /LPF 

*NA*

                    (18 10:32 AM)                          



                                         UA Mucus [None Seen   



                                         /LPF]   







HEMATOLOGY





                    1                   2                   3



                                         Most recent to   



                                         oldest [Reference   



                                         Range]:   

 

                                        7.6 K/CMM 

                          (18 5:57 AM)         4.2 K/CMM 

                          (18 10:32 AM)         



                                         WBC [3.7-10.4 K/CMM]   

 

                                        4.12 M/CMM 

*LOW*

                          (18 5:57 AM)         3.65 M/CMM 

*LOW*

                          (18 10:32 AM)         



                                         RBC [4.20-5.40   



                                         M/CMM]   

 

                                        8.8 g/dL 

*LOW*

                          (18 5:57 AM)         8.8 g/dL 

*LOW*

                          (18 5:57 AM)         9.0 g/dL 

*LOW*

                                        (18 2:25 AM) 



                                         Hgb [12.0-16.0 g/dL]   

 

                                        27.5 % 

*LOW*

                          (18 5:57 AM)         26.9 % 

*LOW*

                          (18 5:57 AM)         28.3 % 

*LOW*

                                        (18 2:25 AM) 



                                         Hct [36.0-48.0 %]   

 

                                        66.6 fL 

*LOW*

                          (18 5:57 AM)         63.4 fL 

*LOW*

                          (18 10:32 AM)         



                                         MCV [80.0-98.0 fL]   

 

                                        21.3 pg 

*LOW*

                          (18 5:57 AM)         19.5 pg 

*LOW*

                          (18 10:32 AM)         



                                         MCH [27.0-31.0 pg]   

 

                                        32.0 g/dL 

                          (18 5:57 AM)         30.7 g/dL 

*LOW*

                          (18 10:32 AM)         



                                         MCHC [32.0-36.0   



                                         g/dL]   

 

                                        25.1 % 

*HI*

                          (18 5:57 AM)         21.2 % 

*HI*

                          (18 10:32 AM)         



                                         RDW [11.5-14.5 %]   

 

                                        7.7 fL 

                          (18 5:57 AM)         7.9 fL 

                          (18 10:32 AM)         



                                         MPV [7.4-10.4 fL]   

 

                                        249 K/CMM 

                          (18 5:57 AM)         261 K/CMM 

                          (18 10:32 AM)         



                                         Platelet [133-450   



                                         K/CMM]   

 

                                        69.4 % 

                          (18 5:57 AM)         59.7 % 

                          (18 10:32 AM)         



                                         Segs [45.0-75.0 %]   

 

                                        20.7 % 

                          (18 5:57 AM)         30.1 % 

                          (18 10:32 AM)         



                                         Lymphocytes   



                                         [20.0-40.0 %]   

 

                                        7.4 % 

                          (18 5:57 AM)         8.1 % 

                          (18 10:32 AM)         



                                         Monocytes [2.0-12.0   



                                         %]   

 

                                        1.6 % 

                          (18 5:57 AM)         1.1 % 

                          (18 10:32 AM)         



                                         Eosinophils [0.0-4.0   



                                         %]   

 

                                        0.9 % 

                          (18 5:57 AM)         1.0 % 

                          (18 10:32 AM)         



                                         Basophils [0.0-1.0   



                                         %]   

 

                                        5.3 K/CMM 

                          (18 5:57 AM)         2.5 K/CMM 

                          (18 10:32 AM)         



                                         Neutrophils #   



                                         [1.5-8.1 K/CMM]   

 

                                        1.6 K/CMM 

                          (18 5:57 AM)         1.3 K/CMM 

                          (18 10:32 AM)         



                                         Lymphocytes #   



                                         [1.0-5.5 K/CMM]   

 

                                        0.6 K/CMM 

                          (18 5:57 AM)         0.3 K/CMM 

                          (18 10:32 AM)         



                                         Monocytes # [0.0-0.8   



                                         K/CMM]   

 

                                        0.1 K/CMM 

                    (18 5:57 AM)                          



                                         Eosinophils #   



                                         [0.0-0.5 K/CMM]   

 

                                        0.1 K/CMM 

                    (18 5:57 AM)                          



                                         Basophils # [0.0-0.2   



                                         K/CMM]   

 

                                        1+ 

*ABN*

                    (18 5:57 AM)                          



                                         Anisocyte [None   



                                         Seen]   

 

                                        1+ 

                    (18 10:32 AM)                          



                                         Hypochrom [None   



                                         Seen]   

 

                                        3+ 

*NA*

                          (18 5:57 AM)         3+ 

*NA*

                          (18 10:32 AM)         



                                         Microcyte [None   



                                         Seen]   

 

                                        Normal 

                          (18 5:57 AM)         Normal 

                          (18 10:32 AM)         



                                         Plt Morph   

 

                                        14.4 seconds 

                    (18 10:32 AM)                          



                                         PT [12.0-14.7   



                                         seconds]   

 

                                        1.12 

                    (18 10:32 AM)                          



                                         INR [0.85-1.17]   

 

                                        34.3 seconds 

                    (18 10:32 AM)                          



                                         PTT [22.9-35.8   



                                         seconds]   







Immunizations





Given and Recorded





   



                 Vaccine         Date            Status          Refusal Reason

 

   



                     influenza virus vaccine, inactivated     18             Given 

 

   



                     pneumococcal 13-valent vaccine     18             Given 

 

   



                     hepatitis B adult vaccine     16             Given 

 

   



                     hepatitis B adult vaccine     3/10/16             Given 

 

   



                     hepatitis B adult vaccine     16              Given 

 

   



                     pneumococcal 23-valent vaccine     16              Recorded 







Procedures







    



              Procedure     Date         Related Diagnosis     Body Site     Status

 

    



                     Endoscopy1          18             Completed

 

    



                     Colonoscopy2        8/3/17              Completed

 

    



                     Echocardiogram3     17             Completed

 

    



                     Bone density scan4     17             Completed

 

    



                     Cholecystectomy     2016             Completed

 

    



                     Knee replacement2014                Completed

 

    



                           Hysterectomy              Completed







1mild gastritis and duodenitis without any active bleeding. @ [facility]



2diverticulosis  Dr Hogan



3EF 68%  Mild MR

Dr Velásquez



4Osteoporosis left femoral neck



5left knee



Social History







 



                           Social History Type       Response

 

 



                           Alcohol                   Never

 

 



                           Smoking Status            Never smoker; Exposure to Tobacco Smoke None; Cigarette Smoking

 Last 365



                                         Days No; Reg Smoking Cessation Counseling No



                                         entered on: 3/1/19







Assessment and Plan

Extracted from:





  



                     Title: Heme-Onc Consult     Author: Han Batista MD     Date: 18









                                        Hematology Oncology Consult Note:



REFERRING PHYSICIAN: Emilio Vaca



REASON FOR CONSULTATION: Anemia



CHIEF COMPLAINT: I had black stools



HISTORY OF PRESENT ILLNESS:

This is a 89-year-old  female with past medical history of hypertension,
asthma, liver cyst, who was sent to the emergency room by her PCP with anemia.  
Patient reports having dark black colored stools over the last 2 days.  GI was 
consulted and patient underwent EGD, which showed normal-appearing esophagus 
with no active bleeding.  Hematology was consulted for further relation and 
management of anemia.



Since admission to the hospital, patient has received 2 units of PRBC.  Her 
hemoglobin has improved from 7.2 to 9.0.  Iron panel was also checked and 
patient was found to have ferritin of 7 and transferrin saturation of 3%.  
Patient reports feeling better after transfusion.



PAST MEDICAL HISTORY:

Anemia

Benign hypertension

Cholelithiasis

Chronic anemia

Chronic back pain

Chronic diarrhea

Gastritis

Liver cyst

Liver mass

Mixed hyperlipidemia

OA (osteoarthritis)

Oral aphthae

Osteoporosis

Prediabetes

Recurrent urinary tract infection

UI (urinary incontinence)

Unstable gait

Weight loss





PAST SURGICAL HISTORY:

Colonoscopy: 17

Endoscopy: 17

Echocardiogram: 17

Bone density scan: 17

Cholecystectomy: 2016

Knee replacement: 2014

Hysterectomy





SOCIAL HISTORY:

Alcohol

Details: Never

Tobacco

Details: Use: Never smoker.  Type: Cigarettes.  Tobacco smoke exposure: None.  
Did the Patient Smoke Cigarettes Anytime During the Last 365 Days? No.  
Cessation Counseling Provided? No.

Details: Use: Never smoker.  Tobacco smoke exposure: None.  Did the Patient 
Smoke Cigarettes Anytime During the Last 365 Days? No.  Cessation Counseling 
Provided? No.





FAMILY HISTORY:

Child: Thyroid disease





ALLERGIES:

Allergies: NKDA





Home Medications

Home Medications (10) Active

alendronate 70 mg oral tablet See Instructions

alendronate 70 mg oral tablet 70 mg=1 tab, PO, Q7D

carvedilol 12.5 mg oral tablet 12.5 mg=1 tab, PO, BID

ferrous sulfate 325 mg oral enteric coated tablet 325 mg=1 tab, PO, Daily

lisinopril 40 mg, PO, Daily

lovastatin 10 mg oral tablet 10 mg=1 tab, PO, Bedtime

omeprazole 40 mg oral delayed release capsule 40 mg=1 cap, PO, Daily

oxybutynin 5 mg oral tablet, extended release See Instructions

Ventolin HFA 90 mcg/inh inhalation aerosol with adapter 180 microgram=2 puff, 
PRN, INHALER, Q6H

Voltaren Topical 1% topical gel 2 gm=1 appl, TOP, BID





Inpatient Medications:

Medications (10) Active

Scheduled Meds (2):

                                        18 pantoprazole 40 mg IVP Q12H

                                        18 sucralfate 1 gm PO QID

Unscheduled Meds: None

PRN Meds (3):

                                        18 phenol topical (Chloraseptic 1.4% spray) 1 spray TOP Daily

                                        18 sodium chloride (Saline Flush 0.9%) 10 mL IVP PRN

                                        18 sodium chloride (Saline Flush 0.9%) 10 mL IVP PRN

One Time Meds (4):

                                        18  (Completed) Sodium Chloride 0.9% IV (Sodium Chloride 0.9% (Bolus) IV) 

500 mL IV ONCE

                                        18  (not done) furosemide (Lasix) 20 mg IVP ONCE

                                        18  (Completed) pantoprazole 40 mg IVP ONCE

                                        18  (Completed) sodium ferric gluconate complex + Sodium Chloride 0.9% IV 

90 mL (Ferrlecit + Sodium Chloride 0.9% IV 90 mL) 125 mg IVPB ONCE 100 ml/hr

Continuous Infusions (1):

                                        18 Sodium Chloride 0.9%  mL (Sodium Chloride 0.9% (titrate) 250 mL) 

250 mL To prime line and flush remaining blood products.







REVIEW OF SYSTEMS:

                                        12 point review of systems was performed and is negative except for what is mentioned

 in HPI



PHYSICAL EXAMINATION:

VitalsTmp(F)[id number]

                                         [id number]---

                                         11:[id number]/113545---

                                         07:[id number]/578328---

                                         03:3298.665975/417890---

                                         23:1098.950149/146715---





                                        24 Hr Tmax: 98.9F (37.17c) at  23:10Vital Signs are the last 5 in the past 

48 hours.



Gen: NAD, AAOx3

HEENT: PERRL, EOMI, MMM

Heart: RRR, S1, s2, no M/R/G

Lungs: CTAB, no W/R/R

Abd: soft, NT/ND, +BS, no HSM

: no suprapubic tenderness

Extrem: no pitting edema noted



DATA:

Labs reviewed, noted

Hct: 27.5 % Low (18 05:57:00)

Hct: 26.9 % Low (18 05:57:00)

Hgb: 8.8 g/dL Low (18 05:57:00)

Hgb: 8.8 g/dL Low (18 05:57:00)

MCH: 21.3 pg Low (18 05:57:00)

MCHC: 32 g/dL (18 05:57:00)

MCV: 66.6 fL Low (18 05:57:00)

MPV: 7.7 fL (18 05:57:00)

Platelet: 249 K/CMM (18 05:57:00)

RBC: 4.12 M/CMM Low (18 05:57:00)

RDW: 25.1 % High (18 05:57:00)

WBC: 7.6 K/CMM (18 05:57:00) AGAP: 15.2 mEq/L (18 10:32:00)

Chloride Lvl: 113 mEq/L High (18 10:32:00)

CO2: 20 mEq/L Low (18 10:32:00)

Potassium Lvl: 4.2 mEq/L (18 10:32:00)

Sodium Lvl: 144 mEq/L (18 10:32:00)

A/G Ratio: 0.9 (18 10:32:00)

Albumin Lvl: 3.4 g/dL Low (18 10:32:00)

Alk Phos: 47 unit/L (18 10:32:00)

ALT: 17 unit/L (18 10:32:00)

AST: 25 unit/L (18 10:32:00)

B/C Ratio: 31 High (18 10:32:00)

Bili Total: 0.3 mg/dL (18 10:32:00)

BUN: 26 mg/dL High (18 10:32:00)

Calcium Lvl: 8.6 mg/dL (18 10:32:00)

Creatinine Lvl: 0.85 mg/dL (18 10:32:00)

eGFR: 62 mL/min/1.73m2 (18 10:32:00)

Globulin: 3.6 g/dL (18 10:32:00)

Glucose Lvl: 107 mg/dL High (18 10:32:00)

Total Protein: 7 g/dL (18 10:32:00)



Imaging reviewed, noted

Imaging Studies (last 36 hours)



ED Abdomen/Pelvis IV contrast only CT

                                        2018 14:30 Impression:

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 Bilateral renal cysts.



Ventral abdominal hernia containing mesenteric fat.



Colonic diverticulosis. No CT evidence for diverticulitis.



Fatty infiltration of liver. Status post cholecystectomy.



SL: [id number]





ASSESSMENT AND PLAN:

Anemia, Microcytic





Consistent with iron deficiency, ferritin 7, transferrin saturation 3%





Appreciate GI workup, no evidence of bleeding on EGD





Pending colonoscopy





We will give patient 1 infusion of iron today prior to discharge





Unfortunately patient has a [place] insurance and I am out of network





Informed patient to seek outside hematologist for further follow-up





Patient received iron tablets from her PCP, encouraged her to take it up to 3 
times a day together with vitamin C



Heme-Onc Dispo

                                        - no further inpatient work up is planned. Patient is cleared for discharge from

 Heme-Onc standoint



Thank you for this consult, will continue to follow with you





Extracted from:





  



                     Title: GI           Author: Stefany Aviles NP     Date: 18









                                        Progress Note - Daily



[facility]             Completed:    Friday, SEP 28, 
2018, 13:20 by Stefany Aviles NP



RM: 158 - 1P, JR SANDOVAL S86y (: 1932)  F     FIN: 
[id number]



Attending: Wolf Vargas MDPhone: [phone]: Internal 
Medicine



Reason for Admission: GI BLEED/ACUTE BLOOD LOSS ANEMIA

Working DRG: 

Code status: Full CodeCurrent diet: 

Isolation: No Isolation/Standard Precautions



Allergies: NKDA



SUBJECTIVE

no new complaints

no diarrhea today

no overt bleeding









OBJECTIVE









                                        24hr Labs

                                         0557

Hgb8.8  L

Hct26.9  L

WBC7.6  

RBC4.12  L

Hgb8.8  L

Hct27.5  L

MCV66.6  L

MCH21.3  L

MCHC32.0  

RDW25.1  H

Lekilbmo147  

MPV7.7  

Segs69.4  

Monocytes7.4  

Vqusxiaxxvp21.7  

Eosinophils1.6  

Basophils0.9  

Segs-Bands #5.3  

Lymphocytes #1.6  

Monocytes #0.6  

Eosinophils #0.1  

Basophils #0.1  

Plt MorphNormal  

Anisocyte1+  

Microcyte3+  

                                         0225

Hgb9.0  L

Hct28.3  L

                                         2250

Hgb9.0  L

Hct28.0  L

                                         1357

Hgb6.7  C

Hct22.2  L



[place] still necessary (Yes/No): Line still necessary (Yes/No): 



VitalsTmp(F)[id number]

                                         11:0298.816698/013181---

                                         07:4798.957452/310030---

                                         03:3298.769057/129300---

                                         23:1098.058803/226092---

                                         19:[id number]---





                                        24 Hr Tmax: 98.9F (37.17c) at  23:10Vital Signs are the last 5 in the past 

48 hours.



DateWt(kg)Wt(lb)Ht(cm)Ht(in)Method

                                         (initial) 70.00 154.00Estimated

                                        52.40 60.00Stated



I&ORecordInOutBal

                                        2824hr Tot    0    0    0

                                        2724hr Tot 1321    0 1321



Medications (9) Active

Scheduled Meds (1):

                                        18 pantoprazole 40 mg IVP Q12H

Unscheduled Meds (2):

                                        18 influenza virus vaccine, inactivated (influenza virus vaccine, inactivated

 high-dose preservative-free intramuscular suspension) 0.5 mL IM ONCALL

                                        18 pneumococcal 13-valent vaccine 0.5 mL IM ONCALL

PRN Meds (2):

                                        18 sodium chloride (Saline Flush 0.9%) 10 mL IVP PRN

                                        18 sodium chloride (Saline Flush 0.9%) 10 mL IVP PRN

One Time Meds (3):

                                        18  (Completed) Sodium Chloride 0.9% IV (Sodium Chloride 0.9% (Bolus) IV) 

500 mL IV ONCE

                                        18  (not done) furosemide (Lasix) 20 mg IVP ONCE

                                        18  (Completed) pantoprazole 40 mg IVP ONCE

Continuous Infusions (1):

                                        18 Sodium Chloride 0.9%  mL (Sodium Chloride 0.9% (titrate) 250 mL) 

250 mL To prime line and flush remaining blood products.





General:   No acute distress.

HENT:  Normocephalic

Neck:  Supple, Non-tender.

Respiratory:  Lungs are clear to auscultation, Respirations are non-labored

Cardiovascular:  Normal rate, Regular rhythm, No murmur,

Gastrointestinal:  Soft, Non-tender, Non-distended.

Integumentary:  Warm, Dry, Pink.

Neurologic:  Alert, Oriented x 3

Psychiatric:  Cooperative, Appropriate mood & affect.





IMPRESSION

                                        1. Microcytic Anemia with ? Melena- stable post transfusion

   -s/p EGD- mild gastritis, mild duodenitis, no bleeding

                                        3. Diarrhea

                                        4. h/o liver cyst, stable from 

     - followed by Dr. Hicks outpt



RECOMMENDATIONS

                                        1. PPI

                                        2. Monitor h/h, transfuse for hgb hgb<7.

                                        3. Montior BMs and for overt GI bleeding

                                        4. heme eval pending

                                        5. Follow up with Fab[facility]ola [facility] at [facility]

                                        6. Follow up wiht Dr. Hogan in 2 weeks after d/c

                                        7. Ok to d/c from GI standpoint after heme evaluation



d.w pt, family members, and Dr. Vargas



GI Attending



I have examined the patient with the Nurse Practitioner and confirmed the 
essential components of history, physical examination, diagnosis and treatment 
plan.  I agree with the patient's care as documented by the Nurse Practitioner.



d/w pts family at length

No further bleeding

Outpt f/u with [facility] next week





Extracted from:





  



                     Title: General Admission H&P *     Author: Wolf Vargas     Date: 18





                                         MD 









                                         Impression and Plan

                                        86-year-old female with a past medical history of hypertension and liver disease

 presented to ER with abnormal labs and dark stools.



Impression:

Acute upper GI bleed/melena

Diarrhea

acute blood loss anemia

Microcytic anemia

History of liver cyst which is stable from , patient is being followed by Dr Hicks.



Plan:

We will admit to medicine

Activity as tolerated

Vitals per unit policy

We will start on PPI

ER is ordered 2 units of PRBC

Serial H&H

Closely monitor for any overt GI bleeding.

The patient condition gets worse, patient may be upgraded to ICU/IMCU

GI consulted

Plan for endoscopic evaluation.

Will need outpatient follow-up for liver cyst

CODE STATUS full

Prognosis guarded

## 2019-06-24 NOTE — XMS REPORT
Summary of Care: 3/1/19 - 3/1/19

                             Created on: 2112



JR BANUELOS

External Reference #: [id number]

: 1932

Sex: Female



Demographics







                          Address                   [address] [place]  [address]-

 

                          Home Phone                [phone]

 

                          Preferred Language        Unknown

 

                          Marital Status            

 

                          Caodaism Affiliation     Yarsanism

 

                          Race                      Other

 

                                        Additional Race(s)  

 

                          Ethnic Group              Unknown





Author







                          Author                    [facility]

 

                          Organization              [facility]

 

                          Address                   Unknown

 

                          Phone                     Unavailable







Encounter





JUWAN Richardson(FIN) [id number] Date(s): 3/1/19 - 3/1/19

[facility] [address], [address] [place] [address]- [phone]

Discharge Disposition: Home or Self Care

Attending Physician: Davida Perales MD





Vital Signs







 



                           Most recent to            1



                                         oldest [Reference 



                                         Range]: 

 

 



                           Height                    152.4 cm



                                         (3/1/19 9:50 AM)

 

 



                           Temperature Oral          97.0 DegF



                           [96.4-99.1 DegF]          (3/1/19 9:50 AM)

 

 



                           Blood Pressure            105/57 mmHg



                           [/60-90 mmHg]       (3/1/19 9:50 AM)

 

 



                           Respiratory Rate          16 BRMIN



                           [14-20 BRMIN]             (3/1/19 9:50 AM)

 

 



                           Peripheral Pulse          66 bpm



                           Rate [ bpm]         (3/1/19 9:50 AM)

 

 



                           Weight                    72.727 kg



                                         (3/1/19 9:50 AM)

 

 



                           Body Mass Index           31.31 m2



                                         (3/1/19 9:50 AM)







Problem List







    



              Condition     Effective Dates     Status       Health Status     Informant

 

    



                           Chronic blood loss        Active  



                                         anemia(Confirmed)    

 

    



                           Oral                      Active  



                                         aphthae(Confirmed)    

 

    



                           Benign                    Active  



                                         hypertension(Confirm    



                                         ed)    

 

    



                           Cholelithiasis(Confi      Active  



                                         rmed)    

 

    



                           Chronic                   Active  



                                         anemia(Confirmed)    

 

    



                           Chronic back              Active  



                                         pain(Confirmed)    

 

    



                           Chronic                   Active  



                                         diarrhea(Confirmed)    

 

    



                           Gastritis(Confirmed)      Active  

 

    



                           Mixed                     Active  



                                         hyperlipidemia(Confi    



                                         rmed)    

 

    



                           Liver                     Active  



                                         cyst(Confirmed)    

 

    



                           Liver                     Active  



                                         mass(Confirmed)    

 

    



                           OA                        Active  



                                         (osteoarthritis)(Con    



                                         firmed)    

 

    



                           Osteoporosis(Confirm      Active  



                                         ed)    

 

    



                           Prediabetes(Confirme      Active  



                                         d)    

 

    



                           Recurrent urinary         Active  



                                         tract    



                                         infection(Confirmed)    

 

    



                           Unstable                  Active  



                                         gait(Confirmed)    

 

    



                           UI (urinary               Active  



                                         incontinence)(Confir    



                                         med)    

 

    



                           Weight                    Active  



                                         loss(Confirmed)    







Allergies, Adverse Reactions, Alerts







   



                 Substance       Reaction        Severity        Status

 

   



                           NKDA                      Active







Medications





Voltaren Topical 1% topical gel

2 gm=1 appl, TOP, BID, Apply to affected area, # 100 gm, 1 Refill(s), Pharmacy: 
[facility] [id number]

Start Date: 3/1/19

Status: Ordered



Results





No data available for this section



Immunizations





Given and Recorded





   



                 Vaccine         Date            Status          Refusal Reason

 

   



                     influenza virus vaccine, inactivated     18             Given 

 

   



                     pneumococcal 13-valent vaccine     18             Given 

 

   



                     hepatitis B adult vaccine     16             Given 

 

   



                     hepatitis B adult vaccine     3/10/16             Given 

 

   



                     hepatitis B adult vaccine     16              Given 

 

   



                     pneumococcal 23-valent vaccine     16              Recorded 







Procedures







    



              Procedure     Date         Related Diagnosis     Body Site     Status

 

    



                     Endoscopy1          18             Completed

 

    



                     Colonoscopy2        8/3/17              Completed

 

    



                     Echocardiogram3     17             Completed

 

    



                     Bone density scan4     17             Completed

 

    



                     Cholecystectomy     2016             Completed

 

    



                     Knee replacement2014                Completed

 

    



                           Hysterectomy              Completed







1mild gastritis and duodenitis without any active bleeding. @ [facility]patient [facility].



2diverticulosis  Dr Hogan



3EF 68%  Mild MR

Dr Velásquez



4Osteoporosis left femoral neck



5left knee



Social History







 



                           Social History Type       Response

 

 



                           Alcohol                   Never

 

 



                           Smoking Status            Never smoker; Exposure to Tobacco Smoke None; Cigarette Smoking

 Last 365



                                         Days No; Reg Smoking Cessation Counseling No



                                         entered on: 3/1/19







Assessment and Plan





No data available for this section

## 2019-06-24 NOTE — XMS REPORT
Summary of Care: 19 - 19

                             Created on: 2094



JR BANUELOS

External Reference #: [id number]

: 1932

Sex: Female



Demographics







                          Address                   [address] [place]  [address]-

 

                          Home Phone                [phone]

 

                          Preferred Language        Unknown

 

                          Marital Status            

 

                          Taoist Affiliation     Bahai

 

                          Race                      Other

 

                                        Additional Race(s)  

 

                          Ethnic Group              /Latin





Author







                          Author                    [facility]

 

                          Organization              [facility]

 

                          Address                   Unknown

 

                          Phone                     Unavailable







Encounter





HQ Encntr_alias(FIN) [id number] Date(s): 19 - 19

[facility] [address], [address] [place] [address]- [phone]





Vital Signs





No data available for this section



Problem List







    



              Condition     Effective Dates     Status       Health Status     Informant

 

    



                           Chronic blood loss        Active  



                                         anemia(Confirmed)    

 

    



                           Oral                      Active  



                                         aphthae(Confirmed)    

 

    



                           Benign                    Active  



                                         hypertension(Confirm    



                                         ed)    

 

    



                           Cholelithiasis(Confi      Active  



                                         rmed)    

 

    



                           Chronic                   Active  



                                         anemia(Confirmed)    

 

    



                           Chronic back              Active  



                                         pain(Confirmed)    

 

    



                           Chronic                   Active  



                                         diarrhea(Confirmed)    

 

    



                           Gastritis(Confirmed)      Active  

 

    



                           Mixed                     Active  



                                         hyperlipidemia(Confi    



                                         rmed)    

 

    



                           Liver                     Active  



                                         cyst(Confirmed)    

 

    



                           Liver                     Active  



                                         mass(Confirmed)    

 

    



                           OA                        Active  



                                         (osteoarthritis)(Con    



                                         firmed)    

 

    



                           Osteoporosis(Confirm      Active  



                                         ed)    

 

    



                           Prediabetes(Confirme      Active  



                                         d)    

 

    



                           Recurrent urinary         Active  



                                         tract    



                                         infection(Confirmed)    

 

    



                           Unstable                  Active  



                                         gait(Confirmed)    

 

    



                           UI (urinary               Active  



                                         incontinence)(Confir    



                                         med)    

 

    



                           Weight                    Active  



                                         loss(Confirmed)    







Allergies, Adverse Reactions, Alerts







   



                 Substance       Reaction        Severity        Status

 

   



                           NKDA                      Active







Medications





lisinopril 40 mg oral tablet

40 mg=1 tab, PO, Daily, # 90 tab, 1 Refill(s), Pharmacy: [id number]
[id number]

Start Date: 19

Status: Ordered



Results





No data available for this section



Immunizations





Given and Recorded





   



                 Vaccine         Date            Status          Refusal Reason

 

   



                     influenza virus vaccine, inactivated     18             Given 

 

   



                     pneumococcal 13-valent vaccine     18             Given 

 

   



                     hepatitis B adult vaccine     16             Given 

 

   



                     hepatitis B adult vaccine     3/10/16             Given 

 

   



                     hepatitis B adult vaccine     16              Given 

 

   



                     pneumococcal 23-valent vaccine     16              Recorded 







Procedures







    



              Procedure     Date         Related Diagnosis     Body Site     Status

 

    



                     Endoscopy1          18             Completed

 

    



                     Colonoscopy2        8/3/17              Completed

 

    



                     Echocardiogram3     17             Completed

 

    



                     Bone density scan4     17             Completed

 

    



                     Cholecystectomy     2016             Completed

 

    



                     Knee replacement5                     Completed

 

    



                           Hysterectomy              Completed







1mild gastritis and duodenitis without any active bleeding. @ [facility]patient [facility].



2diverticulosis  Dr Hogan



3EF 68%  Mild MR

Dr Velásquez



4Osteoporosis left femoral neck



5left knee



Social History







 



                           Social History Type       Response

 

 



                           Alcohol                   Never

 

 



                           Smoking Status            Never smoker; Exposure to Tobacco Smoke None; Cigarette Smoking

 Last 365



                                         Days No; Reg Smoking Cessation Counseling No



                                         entered on: 19







Assessment and Plan





No data available for this section

## 2019-06-24 NOTE — XMS REPORT
Summary of Care: 18 - 18

                             Created on: 2090



JR BANUELOS

External Reference #: [id number]

: 1932

Sex: Female



Demographics







                          Address                   [address] [place]  [address]-

 

                          Home Phone                [phone]

 

                          Preferred Language        Unknown

 

                          Marital Status            

 

                          Orthodox Affiliation     Latter day

 

                          Race                      Other

 

                                        Additional Race(s)  

 

                          Ethnic Group              Unknown





Author







                          Author                    [facility]

 

                          Organization              [facility]

 

                          Address                   Unknown

 

                          Phone                     Unavailable







Encounter





HQ Debra(FIN) [id number] Date(s): 18 - 18

[facility] [address] [address]-     [phone]
[phone]

Discharge Disposition: Home or Self Care

Attending Physician: Davida Perales MD





Vital Signs







  



                     Most recent to      1                   2



                                         oldest [Reference  



                                         Range]:  

 

  



                           Height                    152.4 cm 



                                         (18 11:04 AM) 

 

  



                           Temperature Oral          97.0 DegF 



                           [96.4-99.1 DegF]          (18 11:04 AM) 

 

  



                     Blood Pressure      120/65 mmHg         154/66 mmHg



                     [/60-90 mmHg]     (18 11:36 AM)     *HI*



                                         (18 11:04 AM)

 

  



                           Respiratory Rate          16 BRMIN 



                           [14-20 BRMIN]             (18 11:04 AM) 

 

  



                           Peripheral Pulse          54 bpm 



                           Rate [ bpm]         *LOW* 



                                         (18 11:04 AM) 

 

  



                           Weight                    70 kg 



                                         (18 11:04 AM) 

 

  



                           Body Mass Index           30.14 m2 



                                         (18 11:04 AM) 







Problem List







    



              Condition     Effective Dates     Status       Health Status     Informant

 

    



                           Chronic blood loss        Active  



                                         anemia(Confirmed)    

 

    



                           Oral                      Active  



                                         aphthae(Confirmed)    

 

    



                           Benign                    Active  



                                         hypertension(Confirm    



                                         ed)    

 

    



                           Cholelithiasis(Confi      Active  



                                         rmed)    

 

    



                           Chronic                   Active  



                                         anemia(Confirmed)    

 

    



                           Chronic back              Active  



                                         pain(Confirmed)    

 

    



                           Chronic                   Active  



                                         diarrhea(Confirmed)    

 

    



                           Gastritis(Confirmed)      Active  

 

    



                           Mixed                     Active  



                                         hyperlipidemia(Confi    



                                         rmed)    

 

    



                           Liver                     Active  



                                         cyst(Confirmed)    

 

    



                           Liver                     Active  



                                         mass(Confirmed)    

 

    



                           OA                        Active  



                                         (osteoarthritis)(Con    



                                         firmed)    

 

    



                           Osteoporosis(Confirm      Active  



                                         ed)    

 

    



                           Prediabetes(Confirme      Active  



                                         d)    

 

    



                           Recurrent urinary         Active  



                                         tract    



                                         infection(Confirmed)    

 

    



                           Unstable                  Active  



                                         gait(Confirmed)    

 

    



                           UI (urinary               Active  



                                         incontinence)(Confir    



                                         med)    

 

    



                           Weight                    Active  



                                         loss(Confirmed)    







Allergies, Adverse Reactions, Alerts





No Known Medication Allergies



Medications





No Known Medications



Results





No data available for this section



Immunizations





Given and Recorded





   



                 Vaccine         Date            Status          Refusal Reason

 

   



                     influenza virus vaccine, inactivated     18             Given 

 

   



                     pneumococcal 13-valent vaccine     18             Given 

 

   



                     hepatitis B adult vaccine     16             Given 

 

   



                     hepatitis B adult vaccine     3/10/16             Given 

 

   



                     hepatitis B adult vaccine     16              Given 

 

   



                     pneumococcal 23-valent vaccine     16              Recorded 







Procedures







    



              Procedure     Date         Related Diagnosis     Body Site     Status

 

    



                     Endoscopy1          18             Completed

 

    



                     Colonoscopy2        8/3/17              Completed

 

    



                     Echocardiogram3     17             Completed

 

    



                     Bone density scan4     17             Completed

 

    



                     Cholecystectomy     2016             Completed

 

    



                     Knee replacement2014                Completed

 

    



                           Hysterectomy              Completed







1mild gastritis and duodenitis without any active bleeding. @ [facility]patient [facility].



2diverticulosis  Dr Hogan



3EF 68%  Mild MR

Dr Velásquez



4Osteoporosis left femoral neck



5left knee



Social History







 



                           Social History Type       Response

 

 



                           Alcohol                   Never

 

 



                           Smoking Status            Never smoker; Exposure to Tobacco Smoke None; Cigarette Smoking

 Last 365



                                         Days No; Reg Smoking Cessation Counseling No



                                         entered on: 3/1/19







Assessment and Plan





No data available for this section

## 2019-06-24 NOTE — XMS REPORT
Summary of Care: 18 - 18

                             Created on: 2067



JR BANUELOS

External Reference #: [id number]

: 1932

Sex: Female



Demographics







                          Address                   [address] [place]  [address]-

 

                          Home Phone                [phone]

 

                          Preferred Language        Unknown

 

                          Marital Status            

 

                          Baptism Affiliation     Latter-day

 

                          Race                      Other

 

                                        Additional Race(s)  

 

                          Ethnic Group              Unknown





Author







                          Author                    [facility]

 

                          Organization              [facility]

 

                          Address                   Unknown

 

                          Phone                     Unavailable







Encounter





HQ Wilma_andrei(FIN) [id number] Date(s): 18 - 18

[facility] [address] [address]-     7
-1972





Vital Signs





No data available for this section



Problem List







    



              Condition     Effective Dates     Status       Health Status     Informant

 

    



                           Chronic blood loss        Active  



                                         anemia(Confirmed)    

 

    



                           Oral                      Active  



                                         aphthae(Confirmed)    

 

    



                           Benign                    Active  



                                         hypertension(Confirm    



                                         ed)    

 

    



                           Cholelithiasis(Confi      Active  



                                         rmed)    

 

    



                           Chronic                   Active  



                                         anemia(Confirmed)    

 

    



                           Chronic back              Active  



                                         pain(Confirmed)    

 

    



                           Chronic                   Active  



                                         diarrhea(Confirmed)    

 

    



                           Gastritis(Confirmed)      Active  

 

    



                           Mixed                     Active  



                                         hyperlipidemia(Confi    



                                         rmed)    

 

    



                           Liver                     Active  



                                         cyst(Confirmed)    

 

    



                           Liver                     Active  



                                         mass(Confirmed)    

 

    



                           OA                        Active  



                                         (osteoarthritis)(Con    



                                         firmed)    

 

    



                           Osteoporosis(Confirm      Active  



                                         ed)    

 

    



                           Prediabetes(Confirme      Active  



                                         d)    

 

    



                           Recurrent urinary         Active  



                                         tract    



                                         infection(Confirmed)    

 

    



                           Unstable                  Active  



                                         gait(Confirmed)    

 

    



                           UI (urinary               Active  



                                         incontinence)(Confir    



                                         med)    

 

    



                           Weight                    Active  



                                         loss(Confirmed)    







Allergies, Adverse Reactions, Alerts





No Known Medication Allergies



Medications





alendronate 70 mg oral tablet

70 mg=1 tab, PO, Q7D, # 12 tab, 1 Refill(s), Pharmacy: [facility] 0413
3

Start Date: 18

Status: Ordered



Results





No data available for this section



Immunizations





Given and Recorded





   



                 Vaccine         Date            Status          Refusal Reason

 

   



                     influenza virus vaccine, inactivated     18             Given 

 

   



                     pneumococcal 13-valent vaccine     18             Given 

 

   



                     hepatitis B adult vaccine     16             Given 

 

   



                     hepatitis B adult vaccine     3/10/16             Given 

 

   



                     hepatitis B adult vaccine     16              Given 

 

   



                     pneumococcal 23-valent vaccine     16              Recorded 







Procedures







    



              Procedure     Date         Related Diagnosis     Body Site     Status

 

    



                     Endoscopy1          18             Completed

 

    



                     Colonoscopy2        8/3/17              Completed

 

    



                     Echocardiogram3     17             Completed

 

    



                     Bone density scan4     17             Completed

 

    



                     Cholecystectomy     2016             Completed

 

    



                     Knee replacement5                     Completed

 

    



                           Hysterectomy              Completed







1mild gastritis and duodenitis without any active bleeding. @ [facility]patient [facility].



2diverticulosis  Dr Hogan



3EF 68%  Mild MR

Dr Velásquez



4Osteoporosis left femoral neck



5left knee



Social History







 



                           Social History Type       Response

 

 



                           Alcohol                   Never

 

 



                           Smoking Status            Never smoker; Exposure to Tobacco Smoke None; Cigarette Smoking

 Last 365



                                         Days No; Reg Smoking Cessation Counseling No



                                         entered on: 3/1/19







Assessment and Plan





No data available for this section

## 2019-06-24 NOTE — XMS REPORT
Summary of Care: 19 - 19

                             Created on: 2077



JR BANUELOS

External Reference #: [id number]

: 1932

Sex: Female



Demographics







                          Address                   [address] [place]  [address]-

 

                          Home Phone                [phone]

 

                          Preferred Language        Unknown

 

                          Marital Status            

 

                          Adventist Affiliation     Orthodox

 

                          Race                      Other

 

                                        Additional Race(s)  

 

                          Ethnic Group              /Latin





Author







                          Author                    [facility]

 

                          Organization              [facility]

 

                          Address                   Unknown

 

                          Phone                     Unavailable







Encounter





HQ Debra(FIN) [id number] Date(s): 19 - 19

[facility] [address] [facility], [address] [place] [address]- [phone]

Discharge Disposition: Home or Self Care

Attending Physician: Davida Preales MD





Vital Signs







 



                           Most recent to            1



                                         oldest [Reference 



                                         Range]: 

 

 



                           Height                    152.4 cm



                                         (19 2:51 PM)

 

 



                           Temperature Oral          97.9 DegF



                           [96.4-99.1 DegF]          (19 2:51 PM)

 

 



                           Blood Pressure            142/69 mmHg



                           [/60-90 mmHg]       *HI*



                                         (19 2:51 PM)

 

 



                           Respiratory Rate          14 BRMIN



                           [14-20 BRMIN]             (19 2:51 PM)

 

 



                           Peripheral Pulse          75 bpm



                           Rate [ bpm]         (19 2:51 PM)

 

 



                           Weight                    71.477 kg



                                         (19 2:51 PM)

 

 



                           Body Mass Index           30.77 m2



                                         (19 2:51 PM)







Problem List







    



              Condition     Effective Dates     Status       Health Status     Informant

 

    



                           Chronic blood loss        Active  



                                         anemia(Confirmed)    

 

    



                           Oral                      Active  



                                         aphthae(Confirmed)    

 

    



                           Benign                    Active  



                                         hypertension(Confirm    



                                         ed)    

 

    



                           Cholelithiasis(Confi      Active  



                                         rmed)    

 

    



                           Chronic                   Active  



                                         anemia(Confirmed)    

 

    



                           Chronic back              Active  



                                         pain(Confirmed)    

 

    



                           Chronic                   Active  



                                         diarrhea(Confirmed)    

 

    



                           Gastritis(Confirmed)      Active  

 

    



                           Mixed                     Active  



                                         hyperlipidemia(Confi    



                                         rmed)    

 

    



                           Liver                     Active  



                                         cyst(Confirmed)    

 

    



                           Liver                     Active  



                                         mass(Confirmed)    

 

    



                           OA                        Active  



                                         (osteoarthritis)(Con    



                                         firmed)    

 

    



                           Osteoporosis(Confirm      Active  



                                         ed)    

 

    



                           Prediabetes(Confirme      Active  



                                         d)    

 

    



                           Recurrent urinary         Active  



                                         tract    



                                         infection(Confirmed)    

 

    



                           Unstable                  Active  



                                         gait(Confirmed)    

 

    



                           UI (urinary               Active  



                                         incontinence)(Confir    



                                         med)    

 

    



                           Weight                    Active  



                                         loss(Confirmed)    







Allergies, Adverse Reactions, Alerts







   



                 Substance       Reaction        Severity        Status

 

   



                           NKDA                      Active







Medications





cephalexin 500 mg oral capsule

500 mg=1 cap, PO, TID, # 15 cap, 0 Refill(s), Pharmacy: [facility] [id number]
[id number]

Start Date: 19

Stop Date: 20

Status: Ordered



Results





No data available for this section



Immunizations





Given and Recorded





   



                 Vaccine         Date            Status          Refusal Reason

 

   



                     influenza virus vaccine, inactivated     18             Given 

 

   



                     pneumococcal 13-valent vaccine     18             Given 

 

   



                     hepatitis B adult vaccine     16             Given 

 

   



                     hepatitis B adult vaccine     3/10/16             Given 

 

   



                     hepatitis B adult vaccine     16              Given 

 

   



                     pneumococcal 23-valent vaccine     16              Recorded 







Procedures







    



              Procedure     Date         Related Diagnosis     Body Site     Status

 

    



                     Endoscopy1          18             Completed

 

    



                     Colonoscopy2        8/3/17              Completed

 

    



                     Echocardiogram3     17             Completed

 

    



                     Bone density scan4     17             Completed

 

    



                     Cholecystectomy     2016             Completed

 

    



                     Knee replacement2014                Completed

 

    



                           Hysterectomy              Completed







1mild gastritis and duodenitis without any active bleeding. @ [facility]patient [facility].



2diverticulosis  Dr Hogan



3EF 68%  Mild MR

Dr Velásquez



4Osteoporosis left femoral neck



5left knee



Social History







 



                           Social History Type       Response

 

 



                           Alcohol                   Never

 

 



                           Smoking Status            Never smoker; Exposure to Tobacco Smoke None; Cigarette Smoking

 Last 365



                                         Days No; Reg Smoking Cessation Counseling No



                                         entered on: 19







Assessment and Plan





No data available for this section

## 2019-06-24 NOTE — DIAGNOSTIC IMAGING REPORT
Exam:  Left shoulder 3 views



History:  Trauma



Comparison: None.



Findings:



Diffuse osteopenia. There is an acute, comminuted fracture of the surgical neck

of the left humerus with involvement of the greater tuberosity and impaction of

the humeral shaft into the head by approximately 1 cm. The humeral head

projects appropriately over the glenoid when accounting for suboptimal position

on the transscapular radiograph. Acromioclavicular joint and clavicle appear

intact. Atherosclerotic calcification of the thoracic aorta partially

visualized. Healed fracture posterior left fifth rib partially visualized.



Impression:



Acute, comminuted fracture of the surgical neck and greater humeral tuberosity

with approximately 1 cm fragment impaction.





Signed by: Dr. Gautam Yousif M.D. on 6/24/2019 4:45 PM

## 2019-06-24 NOTE — XMS REPORT
Continuity of Care Document

                             Created on: 2019



JR BANUELOS

External Reference #: [id number]

: 1932

Sex: Female



Demographics







                          Address                   [address] [place]  [address]

 

                          Home Phone                [phone]

 

                          Preferred Language        Unknown

 

                          Marital Status            Unknown

 

                          [employer] Affiliation     Unknown

 

                          Race                      Unknown

 

                          Ethnic Group              Unknown





Author







                          Author                    [facility]              Interface

 

                          Address                   Unknown

 

                          Phone                     Unavailable



                                                    



Problems

                    





                    Problem                            Status                            Onset Date     

                          Classification                            Date Reported       

                          Comments                            Source                    

 

                    Melena                                                        2018                                 

                                        [facility]                    

 

                    ABNORMAL LABS                            Active                            2018

                                                                                       

                                        [facility]                    

 

                          GI BLEED/ACUTE BLOOD LOSS ANEMIA                            Active                

                    2018                                                                 

                                                      [facility]                    

 

                    Shortness of breath                                                        2018                   

                                                      [facility]                    

 

                          R06.02 SHORTNESS OF BREATH                            Active                      

                    2018                                                                       

                                                      [facility]                    

 

                    R06.02                            Active                            2018      

                                                                                       

                                        [facility]                    

 

                    Weakness                                                        2018                               

                                        [facility] [place]                    

 

                          WEAKNESS, UNSTEADINESS ON FEET                            Active                  

                    2018                                                                   

                                                      [facility] [place]          

          

 

                          DX: M81.0=AGE-RELATED OSTEOPOROSIS WITH                            Active         

                    2017                                                          

                                                       [facility]                   

 

 

                    INJECTION                            Active                            2016   

                                                                                       

                                        [facility]                    

 

                    INJECTION HEP C                            Active                            2016

                                                                                       

                                        [facility]                    

 

                    GALLBLADDER                            Active                            2015 

                                                                                       

                                        [facility]                    

 

                          CONSULT FOR CHOLECYSTECTOMY                            Active                     

                    2015                                                                      

                                                      [facility]                    



 

                    4 WK FOLLOW UP                            Active                            2015

                                                                                       

                                        [facility]                    

 

                    LIVER CYST LEFT LOBE                            Active                            2015

                                                                                       

                                        [facility]                    

 

                    Asthma                            Active                                            

                    Problem                            2016                              

                                         [facility]                 

   

 

                    Cholelithiasis                            Active                                    

                          Problem                            2016                    

                                                       [facility]         

           

 

                    Gallstone                            Active                                         

                          Problem                            2016                         

                                                       [facility]              

      

 

                          GERD (<span ID="[id number]">Confirmed</span>)                            Active  

                                                      Problem                        

                    2016                                                         [facility]                    

 

                    High blood pressure                            Active                               

                          Problem                            2016               

                                                       [facility]    

                

 

                    Liver cyst                            Active                                        

                          Problem                            2016                        

                                                       [facility]             

       

 

                    Obesity                            Active                                           

                    Problem                            2016                             

                                         [facility]                

    

 

                    Asthma                            Active                                            

                    Problem                            2017                              

                                         [facility]                    

 

                    Cholelithiasis                            Active                                    

                          Problem                            2019                    

                                                       [facility]                    



 

                    Gallstone                            Active                                         

                          Problem                            2017                         

                                                       [facility]                    

 

                          GERD (<span ID="[id number]">Confirmed</span>)                            Active  

                                                      Problem                        

                    2017                                                         [facility],

 [facility]                    

 

                    Liver cyst                            Active                                        

                          Problem                            2019                        

                                                       [facility]                    

 

                    Obesity                            Active                                           

                    Problem                            2017                             

                                         [facility]                    

 

                    Benign hypertension                            Active                               

                          Problem                            2019               

                                                       [facility]               

     

 

                    Cholelithiasis                            Active                                    

                          Problem                            2019                    

                                                       [facility]                

    

 

                    Chronic anemia                            Active                                    

                          Problem                            2019                    

                                                       [facility]

 [place]                    

 

                    Chronic back pain                            Active                                 

                          Problem                            2019                 

                                                       [facility]

 [facility] [place]                    

 

                    Gastritis                            Active                                         

                          Problem                            2019                         

                                                       [facility]

 [place]                    

 

                    Mixed hyperlipidemia                            Active                              

                          Problem                            2019              

                                                       [facility]

 [facility] [place]                    

 

                    Liver cyst                            Active                                        

                          Problem                            2019                        

                                                       [facility]                    



 

                          OA (<span ID="[id number]">Confirmed</span>)                            Active   

                                                      Problem                         

                    2019                                                         [facility]                    

 

                    Osteoporosis                            Active                                      

                          Problem                            2019                      

                                                      [facility]                    

 

                    Prediabetes                            Active                                       

                          Problem                            2019                       

                                                       [facility]

 [place]                    

 

                          Recurrent urinary tract infection                            Active               

                                                Problem                            2019 

                                                        [facility] [place]                    

 

                    Unstable gait                            Active                                     

                          Problem                            2019                     

                                                       [facility]

 [place]                    

 

                          UI (<span ID="[id number]">Confirmed</span>)                            Active   

                                                      Problem                         

                    2019                                                         [facility]                    

 

                    Asthma                            Active                                            

                    Problem                            2016                              

                                         [facility] [place]                    

 

                    Cholelithiasis                            Active                                    

                          Problem                            2016                    

                                                       [facility]                

    

 

                    Gallstone                            Active                                         

                          Problem                            2016                         

                                                       [facility] [place]                    

 

                          GERD (<span ID="[id number]">Confirmed</span>)                            Active  

                                                      Problem                        

                    2016                                                         [facility],

 [facility] [place]                    

 

                    High blood pressure                            Active                               

                          Problem                            2016               

                                                       [facility] [place]           

         

 

                    Liver cyst                            Active                                        

                          Problem                            2016                        

                                                       [facility] [place]                    



 

                    Obesity                            Active                                           

                    Problem                            2016                             

                                         [facility] [place]                    

 

                    Unsteadiness on feet                                                                

                                                2018                             

                                        [facility] [place]                    

 

                          Difficulty in walking, not elsewhere classified                                   

                                                                             2018

                                                        [facility] [place]

                    

 

                          Other abnormalities of gait and mobility                                          

                                                                            2018       

                                                      [facility] [place]      

              

 

                    Anemia                            Active                                            

                    Problem                            2018                              

                                         [facility] [place]

                    

 

                    Cholelithiasis                            Active                                    

                          Problem                            2018                    

                                                       [facility] [place]  

                  

 

                    Liver cyst                            Active                                        

                          Problem                            2018                        

                                                       [facility] [place]      

              

 

                          Chronic blood loss anemia                            Active                       

                                                Problem                            2019         

                                                       [facility]         

           

 

                    Oral aphthae                            Active                                      

                          Problem                            2019                      

                                                       [facility]                    

 

                    Chronic diarrhea                            Active                                  

                          Problem                            2019                  

                                                       [facility]                  

  

 

                    Liver mass                            Active                                        

                          Problem                            2019                        

                                                       [facility]                    

 

                    Weight loss                            Active                                       

                          Problem                            2019                       

                                                       [facility]                    

 

                    Cardiomegaly                                                                        

                                                2019                                     

                                        [facility]                    

 

                                        Atherosclerotic heart disease of native coronary artery without angina pectoris 

                                                                                       

                          2019                                                   

                                        [facility]                    

 

                          Hepatomegaly, not elsewhere classified                                            

                                                                            2019         

                                                      [facility]                    

 

                    Cyst of kidney, acquired                                                            

                                                      2019                       

                                                      [facility]                    

 

                    Atelectasis                                                                         

                                                2019                                      

                                        [facility]                    

 

                          Other kyphosis, thoracic region                                                   

                                                                            2019                

                                                      [facility]                    

 

                    Atherosclerosis of aorta                                                            

                                                      2019                       

                                                      [facility]                    

 

                    Acute posthemorrhagic anemia                                                        

                                                        2019                   

                                                      [facility]                    

 

                          Gastritis, unspecified, without bleeding                                          

                                                                            2019       

                                                      [facility]                    

 

                    Duodenitis without bleeding                                                         

                                                       2019                    

                                                      [facility]                    

 

                          Noninfective gastroenteritis and colitis, unspecified                             

                                                                                   2019

                                                        [facility]                 

   

 

                    Dysphagia, unspecified                                                              

                                                      2019                         

                                                      [facility]                    

 

                    Essential hypertension                                                              

                                                      2019                         

                                                      [facility]                    

 

                    Encounter for immunization                                                          

                                                      2019                     

                                                      [facility]                    

 

                    Mixed hyperlipidemia                                                                

                                                2019                             

                                        [facility]                    

 

                          Unspecified osteoarthritis, unspecified site                                      

                                                                            2019   

                                                      [facility]                    



 

                                        Age-related osteoporosis without current pathological fracture                  

                                                                                                 

                    2019                                                        [facility]  

                  

 

                    Prediabetes                                                                         

                                                2019                                      

                                        [facility]                    

 

                          Unspecified asthma, uncomplicated                                                 

                                                                            2019              

                                                      [facility]                    

 

                          Iron deficiency anemia, unspecified                                               

                                                                            2019            

                                                      [facility]                    

 

                          Other specified diseases of liver                                                 

                                                                            2019              

                                                      [facility]                    

 

                          Unspecified urinary incontinence                                                  

                                                                            2019               

                                                      [facility]                    

 

                    Other chronic pain                                                                  

                                                2019                               

                                        [facility]                    

 

                    Dorsalgia, unspecified                                                              

                                                      2019                         

                                                      [facility]                    

 

                    ROUTINE MEDICAL EXAM                            Active                              

                                                                                       

                                        [facility]                    

 

                    CHRONIC CHOLECYSTITIS                            Active                             

                                                                                       

                                        [facility]                    

 

                    ALCOHOLIC FATTY LIVER                            Active                             

                                                                                       

                                        [facility]                    

 

                          MEDICAL SERVICES NOT AVAILABLE IN HOME                            Active          

                                                                                         

                                                      [facility]                   

 

 

                          ENCNTR FOR GENERAL ADULT MEDICAL EXAM W/                            Active        

                                                                                       

                                                      [facility]                 

   

 

                          AGE-RELATED OSTEOPOROSIS W/O CURRENT PAT                            Active        

                                                                                       

                                                      [facility]                    

 

                          GASTROINTESTINAL HEMORRHAGE, UNSPECIFIED                            Active        

                                                                                       

                                                      [facility]                    

 

                          ACUTE POSTHEMORRHAGIC ANEMIA                            Active                    

                                                                                                   

                                                      [facility]                    



                                                                                
                                                                                
                                                                                
                                                                                
                                                                                
                                                                                
                                                                                
                                                                                
                                                                                
                                                                                
                                                                                
                                                                                
                                                                                
                                                                                
                                                                                
                                                                                
                                                                                
                                                                                
                                                                      



Medications

                    





                    Medication                            Details                            Route      

                          Status                            Patient Instructions         

                          Ordering Provider                            Order Date           

                                        Source                    

 

                          lovastatin 10 mg oral tablet                            10 mg=1 tab, PO, Bedtime, 

# 90 tab, 1 Refill(s), Pharmacy: [facility] [id number]                     
                                                Active                                               

                                                04/15/2019                             [facility]

                    

 

                          Diclofenac Sodium 0.01 MG/MG Topical Gel [Voltaren]                            2 gm=1

 appl, TOP, BID, Apply to affected area, # 100 gm, 1 Refill(s), Pharmacy: 
[facility] [id number]                                                        Active

                                                                                    2019

                                         [facility]                    

 

                          cephalexin 500 mg oral capsule                            500 mg=1 cap, PO, TID, #

 15 cap, 0 Refill(s), Pharmacy: [facility] [id number]                      
                                                Active                                               

                                                2019                             [facility]

                    

 

                          lisinopril 40 mg oral tablet                            40 mg=1 tab, PO, Daily, # 

90 tab, 1 Refill(s), Pharmacy: [facility] [id number]                       
                                                Active                                                

                                                2019                             [facility]

                    

 

                          Alendronic acid 70 MG Oral Tablet                            70 mg=1 tab, PO, Q7D,

 # 12 tab, 1 Refill(s), Pharmacy: [facility] [id number]                    
                                                Active                                             

                                                2018                             [facility]                    

 

                          omeprazole 40 mg oral delayed release capsule                            40 mg=1 cap,

 PO, Daily, # 90 cap, 0 Refill(s), Pharmacy: [facility] [id number]         
                                                      No Longer Active                      

                                                                            10/24/2018               

                                         [facility]                    

 

                          lisinopril 40 mg oral tablet                            40 mg=1 tab, PO, Daily, # 

90 tab, 0 Refill(s), Pharmacy: [facility] [id number]                       
                                                No Longer Active                                      

                                                10/24/2018                             

[facility]                    

 

                          oxybutynin 10 mg oral tablet, extended release                            10 mg=1 

tab, PO, Daily, # 90 tab, 1 Refill(s), Pharmacy: [facility] [id number]     
                                                   Active                              

                                                      10/05/2018                     

                                         [facility]                    

 

                          sucralfate 1 g oral tablet                            1 gm=1 tab, PO, QID, # 120 tab,

 0 Refill(s), Pharmacy: [facility] [id number]                                 

                       Active                                                          

                          2018                            [facility]         

           

 

                          Carafate                            1 gm, 1 tab, Route: PO, Drug form: TAB, QID, Dosing

 Weight 70, kg, Start date: 18 17:00:00 CDT, Duration: 30 day, Stop date: 
10/28/18 13:00:00 CDTNotes: May interfere w/enteral feeds -  Take 1 hr before or
 2 hr after antacids, dairy pdt, meals & minerals -  On empty stomach.  For 
patients unable to swallow tablet, dissolve in 10mL - 30mL of water or juice and
 stir before giving.  (Same As: Carafate)                                          

                    Inactive                                                                 

                          2018                            [facility]                

    

 

                          phenol                            1 spray, Route: TOP, Daily, Drug form: SPRY, PRN

 Sore Throat, Start date: 18 14:00:00 CDT, Duration: 30 day, Stop date: 
10/28/18 13:59:00 CDTNotes: WASTE: F/P - Black; E - [facility]         
                                                Inactive                                

                                                      2018                       

                                        [facility]                    

 

                          Ferrlecit                            125 mg, 10 mL, Route: IVPB, Drug form: INJ, ONCE,

 Dosing Weight 70, kg, Start date: 18 13:35:00 CDT, Stop date: 18 
13:35:00 CDTNotes: "Limited stability.  Use immediately after admixture" 
"Limited stability.  Use immediately after admixture" "Limited stability.  Use 
immediately after admixture" PROTECT FROM LIGHT  (Same as: Ferrlecit)   *** 
MEDICATION WASTE *** Product Size:  62.5 mg Product Wasted:  ___ mg             
                                                Inactive                                    

                                                2018                            [facility]                    

 

                          Lasix                            20 mg, 2 mL, Route: IVP, Drug form: INJ, ONCE, Dosing

 Weight 70, kg, Start date: 18 22:00:00 CDT, Stop date: 18 22:00:00 
CDTNotes: (Same as: Lasix)                                                        No 

Longer Active                                                                        

                          2018                            [facility]                    

 

                          pantoprazole                            40 mg, Route: IVP, Drug form: INJ, Q12H, Dosing

 Weight 70, kg, Start date: 18 21:00:00 CDT, Duration: 30 day, Stop date: 
10/27/18 9:00:00 CDT                                                        No Longer

 Active                                                                              

                          2018                            [facility]                    

 

                          Hydralazine                            10 mg, 0.5 mL, Route: IV, Drug form: INJ, ONCE,

 Dosing Weight 70, kg, PRN Elevated BP, Start date: 18 19:20:00 CDT, 
 DosingNotes: (Same as: Apresoline) Push over 5 minutes                 
                                                Inactive                                        

                                                2018                            [facility]

                    

 

                          Sodium Chloride 0.9% IV 1,000 mL                            1,000 mL, Rate: 25 ml/hr,

 Infuse over: 40 hr, Route: IV, Dosing Weight 70 kg, Total Volume: 1,000, Start 
date: 18 16:18:00 CDT, Duration: 30 day, Stop date: 10/27/18 16:17:00 CDT,
 1.75, m2                                                        Inactive            

                                                                            2018     

                                        [facility]                    

 

                          Lisinopril                            40 mg, PO, Daily, 0 Refill(s)               

                                                No Longer Active                              

                                                      2018                     

                                        [facility]                    

 

                          Ondansetron                            4 mg, 2 mL, Route: IVP, Drug form: INJ, ONCE,

 Dosing Weight 70, kg, PRN Nausea & Vomiting, Start date: 18 13:03:00 
CDTNotes: (Same as: Zofran)   *** MEDICATION WASTE *** Product Size:  4 mg Pro
duct Wasted:  ___ mg                                                        No Longer

 Active                                                                              

                          2018                            [facility]                    

 

                          pantoprazole 80 mg + Sodium Chloride 0.9%  mL                            100

 mL, Rate: 10 ml/hr, Infuse over: 10 hr, Route: IV, Dosing Weight 70 kg, Total 
Volume: 100, Start date: 18 10:33:00 CDT, Duration: 369 minutes, Stop 
date: 18 19:30:00 CDT, 1.75, h2Rkknd: do not load in pyxis                
                                                Inactive                                       

                                                2018                            [facility]

                    

 

                          pantoprazole                            40 mg, Route: IVP, ONCE, Dosing Weight 70,

 kg, Priority: STAT, Start date: 18 10:33:00 CDT, Stop date: 18 
10:33:00 CDT                                                        Inactive         

                                                                            2018  

                                        [facility]                    

 

                          Saline Flush 0.9%                            10 mL, Route: IVP, Drug Form: INJ, Dosing

 Weight 70, kg, PRN, PRN Line Flush, Start date: 18 10:33:00 CDT, 
Duration: 30 day, Stop date: 10/27/18 10:32:00 CDTNotes: (Same as: BD Posiflush)
                                                        No Longer Active             

                                                                            2018      

                                        [facility]                    

 

                          Sodium Chloride 0.9% (Bolus) IV                            500 mL, Infuse Over: 1 

hr, Route: IV, ONCE, Priority: STAT, Dosing Weight 70 kg, Start date: 18 
10:33:00 CDT, Stop date: 18 10:33:00 CDT                                     

                    Inactive                                                            

                          2018                            [facility]           

         

 

                          Saline Flush 0.9%                            10 mL, Route: IVP, Drug Form: INJ, Dosing

 Weight 70, kg, PRN, PRN Line Flush, Start date: 18 10:13:00 CDT, 
Duration: 30 day, Stop date: 10/27/18 10:12:00 CDTNotes: (Same as: BD Posiflush)
                                                        No Longer Active             

                                                                            2018      

                                        [facility]                    

 

                          Sodium Chloride 0.9% (titrate) 250 mL                            250 mL, Rate: To 

prime line and flush remaining blood products., Dosing Weight 70, kg, Route: IV,
 Total Volume: 250, Start Date: 18 10:13:00 CDT, Duration: 30 day, Stop 
date: 10/27/18 10:12:00 CDT, Replace Every: 24 hr                                  

                          No Longer Active                                               

                                                2018                            [facility]

                    

 

                          ferrous sulfate 325 mg oral enteric coated tablet                            325 mg=1

 tab, PO, Daily, # 90 tab, 0 Refill(s), Pharmacy: [facility] [id number]    
                                                    Active                             

                                                       2018                    

                                        [facility]                    

 

                          Alendronic acid 70 MG Oral Tablet                            70 mg=1 tab, PO, Q7D,

 # 12 tab, 0 Refill(s)                                                        No Longer

 Active                                                                              

                          2018                             [facility]                    

 

                          Rivaroxaban (Xarelto) 10 Mg Tablet, 15 Mg Oral                            Daily   

                                                     Active                            

                                                        2018                   

                                        [facility]                    

 

                          Omnipaque 300 injectable solution                            100 mL, Route: IVP, Drug

 Form: SOLN, Dosing Weight 72.727, kg, ONCALL, GFR > 45 mL/min, Start date: 
18 12:00:00 CDT, Duration: 1 doses or timesNotes: (Same as:Omnipaque 300).
  WASTE: F/P - Black; E - [facility]                                      

                          No Longer Active                                                   

                                                2018                            [facility]    

                

 

                          Triamcinolone Acetonide 1 MG/ML Topical Cream                            1 appl, TOP,

 BID, PRN Apply to affected area(s), X 14 day, # 60 gm, 0 Refill(s), Pharmacy: 
[facility] [id number]                                                        No 

Longer Active                                                                        

                          2018                            [facility]                   

 

 

                          Diclofenac Sodium 0.01 MG/MG Topical Gel [Voltaren]                            2 gm=1

 appl, TOP, BID, Apply to affected area, # 100 gm, 1 Refill(s), Pharmacy: 
[facility] [id number]                                                        Active

                                                                                    2018

                                        [facility]                    

 

                          lovastatin 10 mg oral tablet                            10 mg=1 tab, PO, Bedtime, 

# 90 tab, 1 Refill(s), Pharmacy: [facility] [id number], Decreased dose from
 40 to 10 mg                                                        Active           

                                                                            2018    

                                        [facility]                    

 

                          ciprofloxacin 500 mg oral tablet                            500 mg=1 tab, PO, Q12H,

 for UTI, X 5 day, # 10 tab, 0 Refill(s), Pharmacy: [facility] [id number]  
                                                      Active                         

                                                                            2018                  

                                        [facility]                    

 

                                        Ventolin HFA 90 mcg/inh inhalation aerosol with adapter                         

                                        180 microgram=2 puff, INHALER, Q6H, PRN wheezing, coughing, or shortness of breath,

 # 1 ea, 0 Refill(s), Pharmacy: [facility] [id number]                      
                                                Active                                               

                                                2018                            [facility]

                    

 

                          omeprazole 40 mg oral delayed release capsule                            40 mg=1 cap,

 PO, Daily, # 90 cap, 1 Refill(s), Pharmacy: [facility] [id number]         
                                                Active                                  

                                                      2018                         

                                        [facility]                    

 

                                        Ventolin HFA 90 mcg/inh inhalation aerosol with adapter                         

                                        2 puff, INHALER, Q4H, PRN wheezing, coughing, or shortness of breath, # 8 gm, 3 

Refill(s)                                                        No Longer Active    

                                                                                2018

                                        [facility]                    

 

                          Alendronic acid 70 MG Oral Tablet                            70 mg=1 tab, PO, Q7D,

 with 6 to 8 ounces plain water, at least 30 minutes before first food, 
beverage, or medication of the day, # 12 tab, 3 Refill(s), Pharmacy: [facility] 
[facility] [place] [id number]                                                        No Longer Active

                                                                                    2018

                                        [facility]                    

 

                          Alendronic acid 70 MG Oral Tablet                            See Instructions, # 12

 tab, Refill(s) 1, TAKE 1 TABLET BY MOUTH EVERY 7 DAYS, Pharmacy: [facility] [id number]                                                        Active           

                                                                            2018    

                                        [facility]                    

 

                          oxybutynin 5 mg oral tablet, extended release                            See Instructions,

 TAKE 1 TABLET BY MOUTH DAILY, # 90 tab, 1 Refill(s), Pharmacy: [facility] [id number]                                                        No Longer Active  

                                                                                  2018

                                        [facility]                    

 

                          lovastatin 10 mg oral tablet                            10 mg=1 tab, PO, Bedtime, 

# 90 tab, 1 Refill(s), Pharmacy: [facility] [id number], Decreased dose from
 40 to 10 mg                                                        Active           

                                                                            2018    

                                        [facility]                    

 

                          lisinopril 40 mg oral tablet                            40 mg=1 tab, PO, Daily, # 

90 tab, 1 Refill(s), [facility] [id number]                       
                                                No Longer Active                                      

                                                2018                            [facility]                    

 

                          carvedilol 12.5 mg oral tablet                            12.5 mg=1 tab, PO, BID, 

# 180 tab, 1 Refill(s), [facility] [id number]                    
                                                Active                                             

                                                2018                            [facility]                    

 

                          Alendronic acid 70 MG Oral Tablet                            70 mg=1 tab, PO, Q7D,

 # 12 tab, 0 Refill(s), Pharmacy: [facility] [id number]                    
                                                Inactive                                           

                                                2018                            [facility]

 [facility]                    

 

                          Carvedilol 25 Mg Tablet, 25 Mg Oral                            Daily              

                                                Active                                       

                                                04/15/2017                            [facility]                    

 

                          Ferrous Sulfate 325 Mg Tablet, 325 Mg Oral                            Daily       

                                                 Active                                

                                                      04/15/2017                       

                                        [facility]                    

 

                          Levofloxacin (Levaquin) 500 Mg Tablet, 500 Mg Oral                            Daily

                                                        Active                       

                                                                            04/15/2017                

                                        [facility]                    

 

                          Omeprazole 40 Mg Capsule.dr, 40 Mg Oral                            Daily          

                                                Active                                   

                                                 04/15/2017                            

[facility]                    

 

                          Tramadol Hcl (Ultram 50MG*) 50 Mg Tab, 50 Mg Oral                            Every

 6 Hours as needed for Pain                                                        Active

                                                                                    04/15/2017

                                        [facility]                

    

 

                          Tramadol Hcl (Ultram) 50 Mg Tablet, 50 Mg Oral                            Every 8 

Hours as needed for Pain                                                        Active

                                                                                    2016

                                        [facility]                

    

 

                          Alendronate Sodium 70 Mg Tablet, 70 Mg Oral                            Wkly       

                                                 Active                                

                                                      2016                       

                                        [facility]                    

 

                                        Tolterodine Tartrate (Detrol La) 4 Mg Cap.er.24h, 4 Mg Oral                     

                    Daily                                                        Active             

                                                                            2016      

                                        [facility]                    

 

                                        Omeprazole/Sodium Bicarbonate (Omeprazole-Bicarb 20-1,100 Cap) 1 Each Capsule, 1

 Tab Oral                            Daily                                           

                    Active                                                                    

                          2016                            [facility]                    

 

                          Oxybutynin Chloride 5 Mg Tablet, 5 Mg Oral                            Daily       

                                                 Active                                

                                                      2016                       

                                        [facility]                    

 

                          Alendronic acid 70 MG Oral Tablet [Fosamax]                            70 mg=1 tab,

 PO, Q7D, Take with 8 oz of water, # 12 tab, 3 Refill(s)Special Instructions: 
Take with 8 oz of water                                                        Active

                                                                                    2015

                                        [facility]                    

 

                          tramadol hydrochloride 50 MG Oral Tablet                            50 mg=1 tab, PO,

 Q4H, 0 Refill(s)                                                        Inactive    

                                                                                2015

                                        [facility]                    

 

                          tolterodine 1 mg oral tablet                            1 mg=1 tab, PO, BID, 0 Refill(s)

                                                        Active                       

                                                                            2015                

                                        [facility]                    

 

                          omeprazole 40 mg oral delayed release capsule                            40 mg=1 cap,

 PO, Daily, 0 Refill(s)                                                        Active

                                                                                    2015

                                        [facility]                    

 

                          lovastatin 40 mg oral tablet                            40 mg=1 tab, PO, Daily, 0 

Refill(s)                                                        Active              

                                                                            2015       

                                        [facility]                    

 

                          lisinopril 40 mg oral tablet                            40 mg=1 tab, PO, Daily, 0 

Refill(s)                                                        Active              

                                                                            2015       

                                        [facility]                    

 

                          carvedilol 25 mg oral tablet                            25 mg=1 tab, PO, BID, 0 Refill(s)

                                                        Active                       

                                                                            2015                

                                        [facility]                    

 

                          [id number] 325 Mg Tab, 325 Mg Oral                            Twice A Day as needed for

                                                        Active                       

                                                [place]                            10/24/2014       

                                        [facility]                    

 

                          Ibuprofen 600 Mg Tablet, 600 Mg Oral                            Every 6 Hours     

                                                   Active                              

                                                      10/24/2014                     

                                        [facility]                    

 

                          Alendronate Sodium 70 Mg Tablet                            Use As Directed        

                                                Active                                 

                                                                               [facility]                    

 

                    Aspirin 81 Mg Tab.chew                            Daily                             

                           Active                                                    

                                                                            [facility]                    

 

                          Carvedilol 12.5 Mg Tablet                            Twice A Day                  

                                                Active                                           

                                                                            [facility]                    

 

                          Ferrous Sulfate 325 Mg Tablet.                            Twice A Day           

                                                Active                                    

                                                                            [facility]                    

 

                    Lisinopril 40 Mg Tablet                            Daily                            

                            Active                                                   

                                                                            [facility]                    

 

                    Lovastatin 40 Mg Tablet                            Daily                            

                            Active                                                   

                                                                            [facility]                    

 

                          Omeprazole 40 Mg Capsule.dr                            Daily                      

                                                [facility]                    

 

                                        Oxybutynin Chloride (Oxybutynin Chloride Er) 5 Mg Tab.er.24                     

                    Daily                                                        Active             

                                                                                            

                                        [facility]                    



                                                                                
                                                                                
                                                                                
                                                                                
                                                                                
                                                                                
                                                                                
                                                                                
                                                                                
                                                                                
                                                                                
                                                                                
                                                                                
                                                                                
                                                        



Allergies, Adverse Reactions, Alerts

                    





                    Substance                            Category                            Reaction   

                          Severity                            Reaction type           

                          Status                            Date Reported                     

                          Comments                            Source                    

 

                          No Known Medication Allergies                            Assertion                

                                                                            Drug allergy       

                                                                                       

                                        [facility]                    



                                                                        



Immunizations

                    





                    Immunization                            Date Given                            Site  

                          Status                            Last Updated             

                          Comments                            Source                    

 

                          influenza virus vaccine, inactivated                            2018        

                          Right Thigh                            completed                 

                    [place]                                                        [facility]                    

 

                          pneumococcal 13-valent vaccine                            2018              

                          Left Deltoid                            completed                      

                    [place]                                                        [facility]                    

 

                          hepatitis B adult vaccine                            2016                   

                    Left deltoid                            completed                            Jerald

                                                        [facility]                    

 

                          hepatitis B adult vaccine                            03/10/2016                   

                    Right deltoid                            completed                            

Jerald                                                        [facility]                    

 

                          hepatitis B adult vaccine                            2016                   

                    Right deltoid                            completed                            

Jerald                                                        [facility]                    

 

                          hepatitis B adult vaccine                            2016                   

                    Right deltoid                            completed                            

Jerald                                                        [facility]                    

 

                          hepatitis B adult vaccine                            2016                   

                    Right deltoid                            completed                            

Jerald                                                        [facility]

 [facility]                    

 

                          pneumococcal 23-valent vaccine                            2016              

                                                completed                            Bill

                                                        [facility]

                    



                                                                                
                                                                                
                                        



Results

                    





                    Order Name                            Results                            Value      

                          Reference Range                            Date                

                          Interpretation                            Comments                       

                                        Source                    

 

                    HEMATOLOGY                            MPV                            7.7 fL         

                          7.4 - 10.4                            2018                 

                                                                            [facility]        

            

 

                    HEMATOLOGY                            RDW                            25.1 %         

                          11.5 - 14.5                            2018                

                                                                            [facility]       

             

 

                    HEMATOLOGY                            Platelet                            249 K/CMM 

                            133 - 450                            2018          

                                                                            [facility] 

                   

 

                    HEMATOLOGY                            Hgb                            8.8 g/dL       

                          12.0 - 16.0                            2018              

                                                                            [facility]                            Hct                            27.5 %         

                          36.0 - 48.0                            2018                

                                                                            [facility]                            MCH                            21.3 pg        

                          27.0 - 31.0                            2018               

                                                                            [facility]                            MCHC                            32.0 g/dL     

                          32.0 - 36.0                            2018            

                                                                            [facility]                            MCV                            66.6 fL        

                          80.0 - 98.0                            2018               

                                                                            [facility]                            RBC                            4.12 M/CMM     

                          4.20 - 5.40                            2018            

                                                                            [facility]                            WBC                            7.6 K/CMM      

                          3.7 - 10.4                            2018              

                                                                            [facility]                            Eosinophils                            1.6 %  

                           0.0 - 4.0                            2018           

                                                                            [facility]                            Plt Morph                            Normal 

                    (18 5:57 AM)                                                          2018

                                                                                    [facility]                            Lymphocytes                            20.7 % 

                            20.0 - 40.0                            2018        

                                                                            [facility]                            Monocytes                            7.4 %    

                          2.0 - 12.0                            2018            

                                                                            [facility]                            Basophils                            0.9 %    

                          0.0 - 1.0                            2018             

                                                                            [facility]                            Segs                            69.4 %        

                          45.0 - 75.0                            2018               

                                                                            [facility]                            Lymphocytes #                            1.6 K/CMM

                             1.0 - 5.5                            2018         

                                                                            [facility]                            Eosinophils #                            0.1 K/CMM

                             0.0 - 0.5                            2018         

                                                                            [facility]                            Monocytes #                            0.6 K/CMM

                             0.0 - 0.8                            2018         

                                                                            [facility]                            Neutrophils #                            5.3 K/CMM

                             1.5 - 8.1                            2018         

                                                                            [facility] [place]                            Anisocyte                            1+ 

*ABN*

                    (18 5:57 AM)                              None Seen                            

2018                                                                           

                                        [facility] [place]                            Microcyte                            3+ 

*NA*

                    (18 5:57 AM)                              None Seen                            

2018                                                                           

                                        [facility] [place]                            Basophils #                            0.1 K/CMM

                             0.0 - 0.2                            2018         

                                                                            [facility] [place]                            Hct                            26.9 %         

                          36.0 - 48.0                            2018                

                                                                            [facility] [place]                            Hgb                            8.8 g/dL       

                          12.0 - 16.0                            2018              

                                                                            [facility]     

               

 

                    HEMATOLOGY                            Hct                            28.3 %         

                          36.0 - 48.0                            2018                

                                                                            [facility]       

             

 

                    HEMATOLOGY                            Hgb                            9.0 g/dL       

                          12.0 - 16.0                            2018              

                                                                            [facility]     

               

 

                          BLOOD BANK RESULTS                            RBC product                         

                                        Product available 1

                    (18 11:55 AM)                                                          2018

                                                        Result Comment: 2018 12:19

  [id number]

notified Manda                            [facility]                    

 

                    BLOOD BANK RESULTS                            ABO/Rh                            A NEG

                                                          2018                 

                                                                            [facility]        

            

 

                          BLOOD BANK RESULTS                            Antibody Scrn                       

                                        Negative 

                    (18 10:32 AM)                                                          2018

                                                                                    [facility]                    

 

                    CARDIAC ENZYMES                            Total CK                            104 unit/L

                             12 - 191                            2018          

                                                                            [facility] 

                   

 

                    CARDIAC ENZYMES                            Troponin-I                            null

                            0.00 - 0.40                            2018        

                                                                            [facility]

                    

 

                    CHEM PANEL                            eGFR                            62 mL/min/1.73m2

                                                         2018                  

                                                      Result Comment: The eGFR is calculated using the

 CKD-EPI formula. In most young, healthy individuals the eGFR will be >90 
mL/min/1.73m2. The eGFR declines with age. An eGFR of 60-89 may be normal in 
some populations, particularly the elderly, for whom the CKD-EPI formula has not
 been extensively validated. Use of the eGFR is not recommended in the following
 populations:



Individuals with unstable creatinine concentrations, including pregnant patients
 and those with serious co-morbid conditions.



Patients with extremes in muscle mass or diet. 



The data above are obtained from the National Kid[employer] Education Program 
(NKDEP) which additionally recommends that when the eGFR is used in patients 
with extremes of body mass index for purposes of drug dosing, the eGFR should be
 multiplied by the estimated BMI.                            [facility]          

          

 

                    CHEM PANEL                            BUN                            26 mg/dL       

                          7 - 22                            2018                   

                                                                            [facility]          

          

 

                    CHEM PANEL                            AST                            25 unit/L      

                          0 - 37                            2018                  

                                                                            [facility]         

           

 

                    CHEM PANEL                            Bili Total                            0.3 mg/dL

                             0.2 - 1.3                            2018         

                                                                            [facility]

                    

 

                    CHEM PANEL                            Alk Phos                            47 unit/L 

                            39 - 136                            2018           

                                                                            [facility]  

                  

 

                    CHEM PANEL                            ALT                            17 unit/L      

                          0 - 65                            2018                  

                                                                            [facility]         

           

 

                    CHEM PANEL                            Albumin Lvl                            3.4 g/dL

                             3.5 - 5.0                            2018         

                                                                            [facility]

                    

 

                    CHEM PANEL                            Total Protein                            7.0 g/dL

                             6.4 - 8.4                            2018         

                                                                            [facility]

                    

 

                    CHEM PANEL                            Chloride Lvl                            113 meq/L

                             95 - 109                            2018          

                                                                            [facility] 

                   

 

                    CHEM PANEL                            Calcium Lvl                            8.6 mg/dL

                             8.5 - 10.5                            2018        

                                                                             [facility]

                    

 

                    CHEM PANEL                            CO2                            20 meq/L       

                          24 - 32                            2018                  

                                                                            [facility]         

           

 

                    CHEM PANEL                            Creatinine Lvl                            0.85

 mg/dL                             0.50 - 1.40                            2018 

                                                                                   [facility]

                    

 

                    CHEM PANEL                            Sodium Lvl                            144 meq/L

                             135 - 145                            2018         

                                                                             [facility]

                    

 

                    CHEM PANEL                            Potassium Lvl                            4.2 meq/L

                             3.5 - 5.1                            2018         

                                                                            [facility]

                    

 

                    CHEM PANEL                            Glucose Lvl                            107 mg/dL

                             70 - 99                            2018           

                                                                            [facility]  

                  

 

                    CHEM PANEL                            AGAP                            15.2 meq/L    

                          10.0 - 20.0                            2018           

                                                                            [facility]  

                  

 

                    CHEM PANEL                            A/G Ratio                            0.9      

                          0.7 - 1.6                            2018              

                                                                            [facility]     

               

 

                    CHEM PANEL                            Globulin                            3.6 g/dL  

                           2.7 - 4.2                            2018           

                                                                            [facility]  

                  

 

                    CHEM PANEL                            B/C Ratio                            31       

                          6 - 25                            2018                  

                                                                            [facility]         

           

 

                    HEMATOLOGY                            Platelet                            261 K/CMM 

                            133 - 450                            2018          

                                                                            [facility] 

                   

 

                    HEMATOLOGY                            MPV                            7.9 fL         

                          7.4 - 10.4                            2018                 

                                                                            [facility]        

            

 

                    HEMATOLOGY                            RBC                            3.65 M/CMM     

                          4.20 - 5.40                            2018            

                                                                            [facility]   

                 

 

                    HEMATOLOGY                            WBC                            4.2 K/CMM      

                          3.7 - 10.4                            2018              

                                                                            [facility]     

               

 

                    HEMATOLOGY                            RDW                            21.2 %         

                          11.5 - 14.5                            2018                

                                                                            [facility]       

             

 

                    HEMATOLOGY                            MCV                            63.4 fL        

                          80.0 - 98.0                            2018               

                                                                            [facility]      

              

 

                    HEMATOLOGY                            MCHC                            30.7 g/dL     

                          32.0 - 36.0                            2018            

                                                                            [facility]   

                 

 

                    HEMATOLOGY                            MCH                            19.5 pg        

                          27.0 - 31.0                            2018               

                                                                            [facility]      

              

 

                    HEMATOLOGY                            INR                            1.12           

                          0.85 - 1.17                            2018                 

                                                                            [facility]        

            

 

                    HEMATOLOGY                            PT                            14.4 s          

                          12.0 - 14.7                            2018                 

                                                                            [facility]        

            

 

                    HEMATOLOGY                            PTT                            34.3 s         

                          22.9 - 35.8                            2018                

                                                                            [facility]       

             

 

                    HEMATOLOGY                            Lymphocytes #                            1.3 K/CMM

                             1.0 - 5.5                            2018         

                                                                            [facility]

                    

 

                    HEMATOLOGY                            Basophils                            1.0 %    

                          0.0 - 1.0                            2018             

                                                                            [facility]    

                

 

                    HEMATOLOGY                            Neutrophils #                            2.5 K/CMM

                             1.5 - 8.1                            2018         

                                                                            [facility]

                    

 

                    HEMATOLOGY                            Monocytes #                            0.3 K/CMM

                             0.0 - 0.8                            2018         

                                                                            [facility]

                    

 

                    HEMATOLOGY                            Hypochrom                            1+ 

                          (18 10:32 AM)                              None Seen                         

                    2018                                                                          

                                        [facility]                    

 

                    HEMATOLOGY                            Microcyte                            3+ 

*NA*

                          (18 10:32 AM)                              None Seen                         

                    2018                                                                          

                                        [facility]                    

 

                    HEMATOLOGY                            Eosinophils                            1.1 %  

                           0.0 - 4.0                            2018           

                                                                            [facility]  

                  

 

                    HEMATOLOGY                            Monocytes                            8.1 %    

                          2.0 - 12.0                            2018            

                                                                            [facility]   

                 

 

                    HEMATOLOGY                            Segs                            59.7 %        

                          45.0 - 75.0                            2018               

                                                                            [facility]      

              

 

                    HEMATOLOGY                            Lymphocytes                            30.1 % 

                            20.0 - 40.0                            2018        

                                                                            [facility]

                    

 

                    HEMATOLOGY                            Plt Morph                            Normal 

                    (18 10:32 AM)                                                          2018

                                                                                    [facility]                    

 

                    URINE AND STOOL                            UA Mucus                            Few /LPF

                              None Seen /LPF                            2018   

                                                                                 [facility]

                    

 

                    URINE AND STOOL                            UA RBC                            3 /HPF 

                            0 - 2                            2018              

                                                                            [facility]     

               

 

                    URINE AND STOOL                            UA Sq Epi                            Occasional

 /LPF                              Few /LPF                            2018    

                                                                                [facility]

                    

 

                    URINE AND STOOL                            UA pH                            5.0     

                          5.0 - 8.0                            2018             

                                                                            [facility]    

                

 

                    URINE AND STOOL                            UA Protein                            Negative

 mg/dL                              Negative mg/dL                            2018

                                                                                    [facility]                    

 

                    URINE AND STOOL                            UA Spec Grav                            1.020

                              <=1.030                            2018          

                                                                            [facility] 

                   

 

                    URINE AND STOOL                            UA Turbidity                            Clear

 

                    (18 10:32 AM)                              Clear                            2018

                                                                                    [facility]                    

 

                    URINE AND STOOL                            UA Color                            Yellow

 

*NA*

                    (18 10:32 AM)                              Yellow                            2018

                                                                                     

[facility]                    

 

                    URINE AND STOOL                            UA Blood                            Negative

 

                    (18 10:32 AM)                              Negative                            

2018                                                                           

                                        [facility]                    

 

                    URINE AND STOOL                            UA WBC                            6 /HPF 

                            0 - 5                            2018              

                                                                            [facility]     

               

 

                    URINE AND STOOL                            UA Nitrite                            Negative

 

                    (18 10:32 AM)                              Negative                            

2018                                                                           

                                        [facility]                    

 

                    URINE AND STOOL                            UA Leuk Est                            Large

 

*ABN*

                    (18 10:32 AM)                              Negative                            

2018                                                                           

                                        [facility]                    

 

                          URINE AND STOOL                            UA Urobilinogen                        

                    <=1.0 mg/dL                              0.1 - 1.0                            2018

                                                                                    [facility]                    

 

                    URINE AND STOOL                            UA Ketones                            Negative

 mg/dL                              Negative mg/dL                            2018

                                                                                    [facility]                    

 

                    URINE AND STOOL                            UA Bili                            Negative

 

*NA*

                    (18 10:32 AM)                              Negative                            

2018                                                                           

                                        [facility]                    

 

                    URINE AND STOOL                            UA Glucose                            Negative

 mg/dL                              Negative mg/dL                            2018

                                                                                    [facility]                    

 

                          ED Abdomen/Pelvis IV contrast only CT                            ED Abdomen/Pelvis

 IV contrast only CT                            ED Abdomen/Pelvis IV contrast only 

CT



TECHNIQUE: Contiguous transaxial images of the abdomen and pelvis were performed
 from the lung bases to the superior pubic rami with IV contrast. CONTRAST: 
100cc Omnipaque.  GI CONTRAST: No.



CT imaging performed at this location utilizes radiation dose optimization 
techniques which include one or more of the following:

                                        -Automated exposure control

                                        -Adjustment of the mA and/or kV according to patient size

                                        -Use of iterative reconstruction technique

CT Radiation Dose DLP 1124.48 mGy-cm



CLINICAL HX:  - GI bleed Hgb 7.0;    



COMPARISON: 2015



CT ABDOMEN: 



Lower CHEST: The lung bases are clear. Mild cardiomegaly.



ABDOMINAL VISCERA: Large cystic lesion in right lobe of liver. There are coarse 
peripheral calcifications and is unchanged in appearance from previous study of 
2015. Findings likely related to an old hydatid cyst. No 4.8 cm cystic 
lesion in left lobe of liver, unchanged. The spleen, pancreas and both adrenals 
are unremarkable in appearance. Status post cholecystectomy.



GI TRACT: Lack of oral contrast limits evaluation of bowel.  Colonic 
diverticulosis, most pronounced in the descending colon.  No evidence to suggest
 small or large bowel obstruction. There is no evidence for free fluid or free 
air in the abdomen.  



 TRACT: 



Kidneys: Bilateral renal cysts.The kidneys otherwise demonstrate normal 
morphology. 



Retroperitoneum: No significant retroperitoneal lymphadenopathy is noted.



VASCULATURE:  Advanced aortoiliac atherosclerotic disease.



BONE and SOFT TISSUES: Stable post vertebroplasty changes at L1 and L3 level. No
 acute bony abnormality is noted. Ventral abdominal hernia containing mesenteric
 fat. No bowel herniation.



CT PELVIS: 



The bladder demonstrates normal morphology. Uterus is not visualized suggesting 
prior hysterectomy. No gross adnexal mass is visualized. No free fluid is 
present in the pelvis. 



IMPRESSION: 



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 Bilateral renal cysts.



Ventral abdominal hernia containing mesenteric fat.



Colonic diverticulosis. No CT evidence for diverticulitis.



Fatty infiltration of liver. Status post cholecystectomy.





SL: [id number]



                                                          2018                 

                                                      -

                                        -





Read by:  John Graf MD

Dictated Date/time:  18 14:30

Electronically Signed by:  John Graf MD                         18 
14:48

FINAL REPORT

                                        [facility]                    

 

                    CHEM PANEL                            POC Creatinine                            0.7 

mg/dL                             0.5 - 1.4                            2018    

                                                                                [facility]

                    

 

                    CHEM PANEL                            eGFR                            79 mL/min/1.73m2

                                                         2018                  

                                                      Result Comment: The eGFR is calculated using the

 CKD-EPI formula. In most young, healthy individuals the eGFR will be >90 
mL/min/1.73m2. The eGFR declines with age. An eGFR of 60-89 may be normal in 
some populations, particularly the elderly, for whom the CKD-EPI formula has not
 been extensively validated. Use of the eGFR is not recommended in the following
 populations:



Individuals with unstable creatinine concentrations, including pregnant patients
 and those with serious co-morbid conditions.



Patients with extremes in muscle mass or diet. 



The data above are obtained from the National Kidney Disease Education Program 
(NKDEP) which additionally recommends that when the eGFR is used in patients 
with extremes of body mass index for purposes of drug dosing, the eGFR should be
 multiplied by the estimated BMI.                            [facility]          

          

 

                          Chest w contrast CT                            Chest w contrast CT                

                                        EXAM:  CT CHEST WITH CONTRAST



DATE: 2018 11:31 AM CDT : 1932; Age: 86 years  y/o Female



INDICATION:  - R06.02   Shortness of breath    



COMPARISON: None



TECHNIQUE: Volumetric CT of the chest is acquired following intravenous 
administration of contrast. Axial, coronal and sagittal images are provided.    
    

IV Contrast: 100 mL Omni.

DLP: 223 mGy-cm

CT imaging performed at this location utilizes radiation dose optimization 
techniques which include one or more of the following:

                                        -Automated exposure control

                                        -Adjustment of the mA and/or kV according to patient size

                                        -Use of iterative reconstruction technique



FINDINGS:  



Lower neck:  The visible portions or the lower neck and thyroid are 
unremarkable.



Lymph Nodes: There is no mediastinal or hilar lymphadenopathy. No axillary 
lymphadenopathy.



Heart, pericardium and aorta: : The heart is enlarged. No pericardial effusion. 
There are coronary artery calcifications. Atheromatous changes are present in 
the aorta.. 



LUNGS: Some bibasilar atelectasis, otherwise lungs are clear.  No pleural 
effusions. The central airway is patent. 



Upper abdomen:  7.7 x 7.6 cm cystic lesion with peripheral calcification is seen
 within the right hepatic lobe. Some high attenuation is seen within the 
dependent portion of the cystic focus. Within left hepatic lobe 3.3 cm low-
attenuation lesion is seen with 25 Hounsfield unit density. Gallbladder is 
surgically absent. Partially visualized left kidney cyst.



Soft tissues: Normal.



Bones: Vertebral augmentation is noted within the L1 vertebral body. Severe 
compression of T6 is seen from unknown age. Moderate multilevel spinal 
degenerative changes. Increased thoracic kyphosis.



IMPRESSION:  

No acute infectious pulmonary process within the lungs.



Severe compression of T6 vertebral body is seen from unknown age. 



                                        7.7 x 7.6 cm low-attenuation lesion with peripheral calcification is seen within

 the right hepatic lobe. Some high attenuation is seen within the dependent 
portion of this right hepatic lobe lesion. Within left hepatic lobe 3.3 cm low-
attenuation lesion is seen with 25 Hounsfield unit density. This lesions are 
indeterminate.



Cardiomegaly with coronary artery disease.



Incidental Liver Mass follow-up





>1.5cm

Low attenuation, suspicious imaging  features+-:

\\  Low risk: f/u in 6 mo

\\  Average risk: Multiphasic MRI or f/u CT/MR in 6 mo

\\  High risk: Biopsy, multiphasic MRI or f/u CT/MR in 6mo



*Low risk=< 40 years old, no known malignancy, hepatic dysfxn, hepatic malignant
 risk factors or symptoms attributable to the liver

*Average risk=>40 years old, no known malignancy, hepatic dysfxn, hepatic 
malignant risk factors or symptoms attributable to the liver

\S\High risk=known primary malignancy with propensity to metastasize to the 
liver, cirrhosis, and/or other hepatic risk factors such as hepatitis, 
sclerosing cholangitis, primary biliary cirrhosis, hemochromatosis, 
hemosiderosis, oral contraceptive use, anabolic steroid use. 



REFERENCE:

Saturnino L et al. Managing Incidental Findings on Abdominal CT: White Paper of 
the ACR Incidental Findings Committee. J Am Avinash Radiol 2010; 7: [phone]



SL:  [id number]



                                                          2018                 

                                                      -

                                        -





Read by:  Nickie Taveras

Dictated Date/time:  18 08:23

Electronically Signed by:  Nickie Taveras              18 
10:52

FINAL REPORT

                                        [facility]                    

 

                          Chest 2 views DX                            Chest 2 views DX                      

                                        Patient Name: JR BANUELOS

: 1932; Age: 85 years Female

MR: [id number]



Study: Chest 2 views DX  Order Time: 2018 11:30 AM CDT

Clinical Indication:  - cough; difficulty breathing. PATIENT HERE FOR DIFFICULTY
 BREATHING SINCE SATURDAY. STATES RECENTLY DX WITH ASTHMA.

COMPARISON: 2011. 2008.



FINDINGS: 

Views: 1



LUNGS:  There is normal lung volume. Left lower lobe atelectasis. There are no 
pleural effusions. There is no pneumothorax. The pulmonary vasculature is 
normal. 



MEDIASTINUM: The cardiac silhouette is normal. The trachea is midline.



BONES:  Post vertebroplasty change. Old left posterior rib fractures.





IMPRESSION:

                                        1.  No radiographic evidence of acute pulmonary disease.

Old left posterior rib fractures. 





JACQUELINE:  STEVO



                                                          2018                 

                                                      -

                                        -





Read by:  Miki Tyler MD

Dictated Date/time:  18 14:12

Electronically Signed by:  Miki Tyler MD                 18 
14:13

FINAL REPORT

                                        [facility]                    

 

                          Bone Density Scan                            Bone Density Scan                    

                                        BONE DENSITY:



HISTORY: Osteoporosis.



TECHNIQUE: Dual energy x-ray absorptiometry (DEXA) was done over the lumbar 
spine and left hip on a Holo[employer] Discovery SL scanner.



FINDINGS: The total T-score over the lumbar spine is -0.6, consistent with 
normal bone density.



The global T-score over the left hip is -2.3, consistent with osteopenia. 



The focal T-score over the left femoral neck is -2.9, consistent with 
osteoporosis. The BMD is 0.527 g/sq cm. 



IMPRESSION:

                                        1. Normal bone density of the lumbar spine.

                                        2. Normal osteopenia of the left hip. 

                                        3. Osteoporosis of the left femoral neck.



FOR YOUR INFORMATION:



The World Health Organization has established that OSTEOPOROSIS occurs at -2.5 
or more standard deviations below peak bone mass (T-score). OSTEOPENIA occurs at
 -1.0 to -2.5 standard deviations (T-score) below peak bone mass.



 [id number]



                                                          2017                 

                                                      -

                                        -





Read by:  Gautam Aldana MD

Dictated Date/time:  17 13:18

Electronically Signed by:  Gautam Aldana MD                    17 
13:19

FINAL REPORT

                                        [facility]                    

 

                          Spine lumbar wo contrast MRI                            Spine lumbar wo contrast MRI

                                        MRI LUMBAR SPINE WITHOUT CONTRAST 



COMPARISON: No prior exam.



TECHNIQUE: Sagittal T1, sagittal T2 with fat saturation, axial T1 and axial T2 
images were obtained. No intravenous gadolinium was given.



FINDINGS: 



The paravertebral soft tissues are normal. 



The conus medullaris terminates at the L1 level. Mild levo scoliosis of the 
lumbar spine is present.



Severe multiple level lumbar spine degenerative changes are present. 

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 



T11-T12 ligamenta flava redundancy with mild central canal stenosis is present.



L1 and L3 mild to moderate chronic vertebral compression fractures are seen 
without marrow edema. No significant retropulsion is present.



T12-L1: 4.8 mm central and right paracentral disc protrusion is present with 
moderate central canal stenosis. No significant mass effect on the conus 
medullaris. Mild bilateral foraminal stenosis.



L1-L2: Minimal disc bulge without central canal or foraminal stenosis.



L2-L3: 2 mm disc bulge with moderate ligamenta flava redundancy with mild to 
moderate central canal stenosis in combination with the short pedicles. Moderate
 bilateral foraminal stenosis.



L3-L4: 3 mm grade 1 anterolisthesis is present with severe facet osteoarthritis.
 Severe central canal stenosis and right lateral recess stenosis are present 
with the thecal sac measuring 6.7 mm in AP dimension. Severe right foraminal 
stenosis and moderate to severe left foraminal stenosis are present.



L4-L5: 3 mm disc bulge is present with significant left facet osteoarthritis. 
Moderate central canal stenosis and bilateral lateral recess stenosis are 
present. Mild right foraminal stenosis and moderate to severe left foraminal 
stenosis.



L5-S1: Facet osteoarthritis without central canal or foraminal stenosis.



   



IMPRESSION:

                                        1. Multilevel disc degenerative disease and spondylosis. Congenital short lumbar

 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 Levoscoliosis.

                                        2. L1 and L3 chronic vertebral compression fractures.

                                        3. T12-L1 disc protrusion with moderate central canal stenosis. No mass effect on

 the conus medullaris.

                                        4. L2-L3 mild to moderate central canal stenosis, L3-L4 severe central canal stenosis,

 L4-L5 moderate central canal stenosis.

                                        5. Multilevel moderate to severe foraminal stenosis as above.

 



                                                          2016                 

                                                      -

                                        -





Read by:  Orlin Gerardo MD

Dictated Date/time:  16 16:37

Electronically Signed by:  Orlin Gerardo MD                      16 
16:43

FINAL REPORT

                                        [facility]                    

 

                          Gallbladder scan HIDA w meds NM                            Gallbladder scan HIDA w

 meds NM                                Hepatobiliary scan with medication,

 

Date: Oct 12, 2015 05:24:39 PM 

 

CLINICAL INDICATION: Right upper quadrant abdominal pain, evaluate for 
gallbladder disorders.

 

COMPARISON: Prior CT of the abdomen and pelvis dated 2015

 

TECHNIQUE: After the intravenous administration of 5.0 mCi of technetium 99m 
Choletec, dynamic blood flow images followed by sequential static images through
 60 minutes were obtained. Delayed images at 2 hours were also obtained. 
Subsequently the patient was injected with an additional 2 mCi of technetium 99m
 Choletec and delayed images of the abdomen were obtained at 3 hours.

 

FINDINGS:

 

There is good tracer uptake in the liver with homogeneous distribution. The 
liver appears normal in size and shape. There is timely excretion of tracer into
 the extrahepatic biliary tree, and bowel. The gallbladder was not visualized up
 to 3 hours delayed imaging. These findings are suggestive of acute 
cholecystitis or cystic duct obstruction.

 

IMPRESSION:

 

Nonvisualization of the gallbladder up to 3 hours delayed imaging suggestive of 
cystic duct obstruction or acute cholecystitis.

 

 

 

Dr. Hicks was informed of the results at 5:50 p.m. on 2015.

                                                          10/12/2015                 

                                                      -

                                        -





Read by:  Johana Olivera MD

Dictated Date/time:  10/12/15 18:08

Electronically Signed by:  Johana Olivera MD                          10/12/15 
18:14

FINAL REPORT

                                        [facility]                    



                                                                                
                                                                                
                                                                                
                                                                                
                                                                                
                                                                                
                                                                                
                                                                                
                                                                                
                                                                                
                                                                                
                                                                                
                                                                                
                                                                                
                                                                                
                                                                                
                                                                                
                                                                                
                                                                                
                                                                                
                                



Vital Signs

                    





                    Vital Sign                            Value                            Date         

                          Comments                            Source                    

 

                    BMI Calculated                            31.31                             2019

                                                         [facility]             

       

 

                    Weight                            72.727                             2019     

                                                       [facility]                  

  

 

                    Height                            152.4 cm                            2019    

                                                       [facility]                 

   

 

                    Respitory Rate                            16                             2019 

                                                        [facility]              

      

 

                    Heart Rate                            66                             2019     

                                                       [facility]                  

  

 

                    Temperature Oral (F)                            97.0 F                            2019

                                                         [facility]             

       

 

                    Systolic (mm Hg)                            105                             2019

                                                         [facility]             

       

 

                    Diastolic (mm Hg)                            57                             2019

                                                         [facility]             

       

 

                    BMI Calculated                            30.77                             2019

                                                         [facility]             

       

 

                    Weight                            71.477                             2019     

                                                       [facility]                  

  

 

                    Height                            152.4 cm                            2019    

                                                      [facility]                 

   

 

                    Heart Rate                            75                             2019     

                                                       [facility]                  

  

 

                    Respitory Rate                            14                             2019 

                                                        [facility]              

      

 

                    Temperature Oral (F)                            97.9 F                            2019

                                                         [facility]             

       

 

                    Systolic (mm Hg)                            142                             2019

                                                         [facility]             

       

 

                    Diastolic (mm Hg)                            69                             2019

                                                         [facility]             

       

 

                    Systolic (mm Hg)                            120                             2018

                                                         [facility]             

       

 

                    Diastolic (mm Hg)                            65                             2018

                                                         [facility]             

       

 

                    Temperature Oral (F)                            97.0 F                            2018

                                                         [facility]             

       

 

                    Heart Rate                            54                             2018     

                                                       [facility]                  

  

 

                    Respitory Rate                            16                             2018 

                                                        [facility]              

      

 

                    Systolic (mm Hg)                            154                             2018

                                                         [facility]             

       

 

                    Diastolic (mm Hg)                            66                             2018

                                                         [facility]             

       

 

                    Height                            152.4 cm                            2018    

                                                       [facility]                 

   

 

                    Weight                            70                             2018         

                                                       [facility]                    

 

                    BMI Calculated                            30.14                             2018

                                                         [facility]             

       

 

                    Height                            152.4 cm                            10/05/2018    

                                                       [facility]                 

   

 

                    BMI Calculated                            30.53                             10/05/2018

                                                         [facility]             

       

 

                    Weight                            70.909                             10/05/2018     

                                                       [facility]                  

  

 

                    Respitory Rate                            16                             10/05/2018 

                                                        [facility]              

      

 

                    Temperature Oral (F)                            96.9 F                            10/05/2018

                                                         [facility]             

       

 

                    Heart Rate                            68                             10/05/2018     

                                                       [facility]                  

  

 

                    Systolic (mm Hg)                            137                             10/05/2018

                                                         [facility]             

       

 

                    Diastolic (mm Hg)                            62                             10/05/2018

                                                         [facility]             

       

 

                    Heart Rate                            78                             2018     

                                                      [facility]                    

 

                    Respitory Rate                            18                             2018 

                                                       [facility]                  

  

 

                    Systolic (mm Hg)                            164                             2018

                                                        [facility]                 

   

 

                    Diastolic (mm Hg)                            73                             2018

                                                        [facility]                 

   

 

                    Temperature Oral (F)                            98 F                            2018

                                                        [facility]                 

   

 

                    Respitory Rate                            18                             2018 

                                                       [facility]                  

  

 

                    Heart Rate                            68                             2018     

                                                       [facility]                    

 

                    Systolic (mm Hg)                            154                             2018

                                                        [facility]                 

   

 

                    Diastolic (mm Hg)                            64                             2018

                                                        [facility]                 

   

 

                    Temperature Oral (F)                            98.5 F                            2018

                                                        [facility]                 

   

 

                    Respitory Rate                            18                             2018 

                                                       [facility]                  

  

 

                    Heart Rate                            65                             2018     

                                                       [facility]                    

 

                    Systolic (mm Hg)                            153                             2018

                                                        [facility]                 

   

 

                    Diastolic (mm Hg)                            68                             2018

                                                        [facility]                 

   

 

                    Temperature Oral (F)                            98.4 F                            2018

                                                        [facility]                 

   

 

                    Height                            152.4 cm                            2018    

                                                      [facility]                    

 

                    Weight                            70                             2018         

                                                       [facility]                    

 

                    BMI Calculated                            30.14                             2018

                                                         [facility]                 

   

 

                    Weight                            70                             2018         

                                                       [facility]                    

 

                    BMI Calculated                            30.14                             2018

                                                         [facility]                 

   

 

                    Height                            152.4 cm                            2018    

                                                       [facility]                    

 

                    Weight                            70                             2018         

                                                       [facility]                    

 

                    BMI Calculated                            31.17                             2018

                                                         [facility]             

       

 

                    Height                            149.86 cm                            2018   

                                                      [facility]                

    

 

                    Systolic (mm Hg)                            168                             2018

                                                         [facility]             

       

 

                    Diastolic (mm Hg)                            57                             2018

                                                         [facility]             

       

 

                    Heart Rate                            60                             2018     

                                                       [facility]                  

  

 

                    Temperature Oral (F)                            97.9 F                            2018

                                                         [facility]             

       

 

                    Respitory Rate                            14                             2018 

                                                       [facility]              

      

 

                    Systolic (mm Hg)                            129                             2018

                                                         [facility]             

       

 

                    Diastolic (mm Hg)                            68                             2018

                                                         [facility]             

       

 

                    Weight                            72.727                             2018     

                                                       [facility]                  

  

 

                    BMI Calculated                            32.38                             2018

                                                         [facility]             

       

 

                    Height                            149.86 cm                            2018   

                                                       [facility]                

    

 

                    Respitory Rate                            16                             2018 

                                                        [facility]              

      

 

                    Temperature Oral (F)                            97.9 F                            2018

                                                         [facility]             

       

 

                    Heart Rate                            64                             2018     

                                                      [facility]                  

  

 

                    Systolic (mm Hg)                            160                             2018

                                                        [facility]             

       

 

                    Diastolic (mm Hg)                            64                             2018

                                                         [facility]             

       

 

                    BMI Calculated                            32.38                             2018

                                                         [facility]             

       

 

                    Weight                            72.727                             2018     

                                                       [facility]                  

  

 

                    Height                            149.86 cm                            2018   

                                                       [facility]                

    

 

                    Temperature Oral (F)                            97.1 F                            2018

                                                         [facility]             

       

 

                    Respitory Rate                            16                             2018 

                                                        [facility]              

      

 

                    Heart Rate                            84                             2018     

                                                       [facility]                  

  

 

                    Systolic (mm Hg)                            111                             2018

                                                         [facility]             

       

 

                    Diastolic (mm Hg)                            69                             2018

                                                         [facility]             

       

 

                    Weight                            72.727                             2018     

                                                       [facility]                  

  

 

                    Height                            149.86 cm                            2018   

                                                       [facility]                

    

 

                    BMI Calculated                            32.38                             2018

                                                         [facility]             

       

 

                    Systolic (mm Hg)                            118                             2018

                                                         [facility]             

       

 

                    Diastolic (mm Hg)                            64                             2018

                                                         [facility]             

       

 

                    Heart Rate                            74                             2018     

                                                       [facility]                  

  

 

                    Temperature Oral (F)                            96.8 F                            2018

                                                         [facility]             

       

 

                    Respitory Rate                            16                             2018 

                                                        [facility]              

      

 

                    BMI Calculated                            32.59                             2018

                                                         [facility]             

       

 

                    Height                            149.86 cm                            2018   

                                                       [facility]                

    

 

                    Temperature Oral (F)                            96.9 F                            2018

                                                         [facility]             

       

 

                    Respitory Rate                            16                             2018 

                                                        [facility]              

      

 

                    Heart Rate                            62                             2018     

                                                       [facility]                  

  

 

                    Weight                            73.182                             2018     

                                                       [facility]                  

  

 

                    Systolic (mm Hg)                            136                             2018

                                                         [facility]             

       

 

                    Diastolic (mm Hg)                            64                             2018

                                                         [facility]             

       

 

                    Systolic (mm Hg)                            135                             2018

                                                         [facility]             

       

 

                    Diastolic (mm Hg)                            68                             2018

                                                         [facility]             

       

 

                    Temperature Oral (F)                            97.5 F                            2018

                                                         [facility]             

       

 

                    Height                            154.94 cm                            2018   

                                                       [facility]                

    

 

                    BMI Calculated                            30.51                             2018

                                                         [facility]             

       

 

                    Weight                            73.239                             2018     

                                                       [facility]                  

  

 

                    Systolic (mm Hg)                            184                             2018

                                                         [facility]             

       

 

                    Diastolic (mm Hg)                            66                             2018

                                                         [facility]             

       

 

                    Heart Rate                            60                             2018     

                                                       [facility]                  

  

 

                    Respitory Rate                            16                             2018 

                                                        [facility]              

      

 

                    Weight                            72.7                             2015       

                                                      [facility]             

       

 

                    BMI Calculated                            32.4                             2015

                                                        [facility]      

              

 

                    Height                            149.8 cm                            2015    

                                                      [facility]          

          

 

                    Heart Rate                            58                             2015     

                                                      [facility]           

         

 

                    Respitory Rate                            19                             2015 

                                                       [facility]       

             

 

                    Systolic (mm Hg)                            151                             2015

                                                        [facility]      

              

 

                    Diastolic (mm Hg)                            79                             2015

                                                        [facility]      

              

 

                    Weight                            72.182                             2015     

                                                      [facility]           

         

 

                    BMI Calculated                            32.17                             2015

                                                        [facility]      

              

 

                    Height                            149.8 cm                            2015    

                                                      [facility]          

          

 

                    Heart Rate                            71                             2015     

                                                      [facility]           

         

 

                    Systolic (mm Hg)                            138                             2015

                                                        [facility]      

              

 

                    Diastolic (mm Hg)                            77                             2015

                                                        [facility]      

              

 

                    Height                            149.86 cm                            2015   

                                                      [facility]         

           

 

                    Weight                            69.773                             2015     

                                                      [facility]           

         

 

                    BMI Calculated                            31.07                             2015

                                                        [facility]      

              

 

                    Temperature Oral (F)                            97.4 F                            2015

                                                        [facility]      

              

 

                    Heart Rate                            63                             2015     

                                                      [facility]           

         

 

                    Systolic (mm Hg)                            173                             2015

                                                        [facility]      

              

 

                    Diastolic (mm Hg)                            70                             2015

                                                        [facility]      

              



                                                                                
                                                                                
                                                                                
                                                                                
                                                                                
                                                                                
                                                                                
                                                                                
                                                                                
                                                                                
                                                                                
                                                                                
                                                                                
                                                                                
                                                                                
                                                                                
                                                                                
                                                                                
                                                                                
                                                                                
                                                                                
                                                                                
                                                                                
                                                                                
                                                                                
                                                                                
                        



Encounters

                    





                    Location                            Location Details                            Encounter

 Type                            Encounter Number                            Reason For

 Visit                            Attending Provider                            ADM Date

                            DC Date                            Status                

                                        Source                    

 

                    [facility]                                                        Outpatient

                            [id number]                                             

                          Urbano Hicks                             2015                                                        [facility]                                                        Outpatient

                            [id number]                                             

                          Urbano Hicks                             2015                                                        [facility] [place]                                             

                          Outpt Diag Services                            [id number]               

                                                Urbano Hicks                             10/12/2015

                            10/13/2015                                               

                                         [facility]                    

 

                          [facility]                                                   

                    Outpatient                            [id number]                                

                          Non Physician                             2015                                                        [facility]                                                        Outpatient

                            [id number]                                             

                          Urbano Hicks                             2016                    

                    02/10/2016                                                        [facility]                                                        Outpatient

                            [id number]                                             

                          Urbano Hicks                             03/10/2016                    

                    2016                                                        [facility] [place]                                                

                          Outpt Diag Services                            [id number]                  

                                                Gautam Martino                             2016                                               

                                         [facility] [place]                    

 

                    [facility]                                                        Outpatient

                            [id number]                                             

                          Urbano Hicks                             2016                                                        [facility]                    

 

                                                                            Outpatient                  

                    [id number]                                                        ELI MENDEZ

                             2017                                              

                          Active                            [facility]                                               

                    Outpatient                            [id number]                            

                            Eli Mendez                             2017    

                          02/15/2017                                                   

                                        [facility]                    

 

                                                                            Outpatient                  

                    [id number]                                                        ELI MENDEZ

                             2017                                              

                          Active                            [facility]                    

 

                                                                            Outpatient                  

                    [id number]                                                        ELI MENDEZ

                             2017                                              

                          Active                            [facility]                    

 

                                                                            Outpatient                  

                    [id number]                                                        ELI MENDEZ

                             2017                                              

                          Active                            [facility]                    

 

                                                                            Outpatient                  

                    [id number]                                                        ELI MENDEZ

                             2017                                              

                          Active                            [facility]                    

 

                                                                            Outpatient                  

                    [id number]                                                        ELI MENDEZ

                             2017                                              

                          Active                            [facility]                    

 

                                                                            Outpatient                  

                    [id number]                                                        ELI MENDEZ

                             2018                                              

                          Active                            [facility]                                                        Outpatient

                            [id number]                                             

                          Eli Mendez                             2018                                                         Medical Group

                    

 

                    [facility]                                                        Phone

 Message                            [phone]                                     

                                                  2018                                                        [facility]   

                 

 

                    [facility]                                                        OP

 Therapy Patients                            [id number]                            

                            Eli Mendez                             2018                                                   

                                        [facility]                    

 

                                                                            Departed Emergency Room     

                          [id number]                                                  

                    CARLEY HENAO MD                            2018                                                        [facility]                                                        Phone

 Message                            [phone]                                     

                                                  2018                                                        [facility]   

                 

 

                                                                            Outpatient                  

                    [id number]                                                        ELI MENDEZ

                             2018                                              

                          Active                            [facility]                                                        Outpatient

                            [id number]                                             

                          Eli Mendez                             2018                                                        [facility]

                    

 

                    [facility]                                                        Phone

 Message                            [phone]                                     

                                                  2018                                                        [facility]   

                 

 

                    [facility]                                                        Phone

 Message                            [phone]                                     

                                                  2018                                                        [facility]   

                 

 

                                                                            Outpatient                  

                    [id number]                                                        ELI MENDEZ

                             2018                                              

                          Active                            [facility]                                                        Outpatient

                            [id number]                                             

                          Eli Mendez                             2018                                                        [facility]

                    

 

                          [facility]                                               

                    Outpatient                            [id number]                            

                            Eli Mendez                             2018                                                   

                                        [facility]                    

 

                                                                            Outpatient                  

                    [id number]                                                        ELI MENDEZ

                             2018                                              

                          Active                            [facility]                                                        Outpatient

                            [id number]                                             

                          Eli Mendez                             2018                                                        [facility]

                    

 

                                                                            Outpatient                  

                    [id number]                                                        ELI MENDEZ

                             2018                                              

                          Active                            [facility]                                                        Outpatient

                            [id number]                                             

                          Eli Mendez                             2018                                                        [facility]

                    

 

                          [facility]                                               

                    Outpatient                            [id number]                            

                            Grayson Blanchard                             2018                                                    

                                        [facility]                    

 

                                                                            Departed Emergency Room     

                          [id number]                                                  

                          TALIA MESA MD                            2018                                                        [facility]                                                        Phone

 Message                            [phone]                                     

                                                  2018                                                        [facility]   

                 

 

                                                                            Outpatient                  

                    [id number]                                                        JENNIFER BOYKIN

                             2018                                              

                          Active                            [facility]                                                        Outpatient

                            [id number]                                             

                          Jennifer Villafuerte                             2018                                                        [facility]                    

 

                          [facility]                                               

                    Inpatient                            [id number]                             

                           Wolf Vargas                             2018                                                     

                                        [facility]                    

 

                                                                            Outpatient                  

                    [id number]                                                        ELI MENDEZ

                             10/05/2018                                              

                          Active                            [facility]                                                        Outpatient

                            [id number]                                             

                          Eli Mendez                             10/05/2018                     

                    10/06/2018                                                         Medical Group

                    

 

                    [facility]                                                        Phone

 Message                            [phone]                                     

                                                  10/23/2018                            

10/25/2018                                                         Medical Group   

                 

 

                                                                            Outpatient                  

                    [id number]                                                        ELI MENDEZ

                             2018                                              

                          Active                            [facility]                                                        Outpatient

                            [id number]                                             

                          Eli Mendez                             2018                                                         Medical Group

                    

 

                    [facility]                                                        Phone

 Message                            [phone]                                     

                                                  2018                                                         Medical Group   

                 

 

                    [facility]                                                        Phone

 Message                            [phone]                                     

                                                  2019                                                         Medical Group   

                 

 

                                                                            Outpatient                  

                    [id number]                                                        GIANCARLOAO DEON

                             2019                                              

                          Active                            [facility]                                                        Outpatient

                            [id number]                                             

                          Eli Mendez                             2019                                                         Medical Group

                    

 

                                                                            Outpatient                  

                    [id number]                                                        ELI MENDEZ

                             2019                                              

                          Active                            [facility]                                                        Outpatient

                            [id number]                                             

                          Eli Mendez                             2019                                                         Medical Group

                    

 

                    [facility]                                                        Between

 Visit                            [id number]                                       

                                                  2019

                                                         Medical Group             

       

 

                    [facility]                                                        Phone

 Message                            [phone]                                     

                                                  04/15/2019                            

2019                                                         Medical Group   

                 

 

                                                                            Outpatient                  

                    [id number]                                                        ELI MENDEZ

                             2019                                              

                          Active                            [facility]                    



                                                                                
                                                                                
                                                                                
                                                                                
                                                                                
                                                                                
                                                                                
                                                                                
                                                        



Procedures

                    





                    Procedure                            Code                            Date           

                          Perfomer                            Comments                        

                                        Source                    

 

                          Endoscopy<sup>1</sup>                            [id number]                        

                    2018                                                        mild gastritis and

 duodenitis without any active bleeding. @ Inpatient [facility]                    

 

                          Endoscopy<sup>1</sup>                            [id number]                        

                    2018                                                        mild gastritis and

 duodenitis without any active bleeding. @ Inpatient [facility]                    

 

                          Colonoscopy<sup>1</sup>                            [id number]                       

                    2017                                                        diverticulosis Dr Hogan                                  [facility]                    

 

                          Endoscopy<sup>2, 3</sup>                            [id number]                     

                    2017                                                        Heital hernia

Dr.Harry Samayoa                            [facility]                

    

 

                          Colonoscopy<sup>1</sup>                            [id number]                       

                    2017                                                        diverticulosis Dr Hogan                                  [facility]                    

 

                          Endoscopy<sup>2, 3</sup>                            [id number]                     

                    2017                                                        Heital hernia

Dr.Harry Samayoa                            [facility]                    



 

                          Colonoscopy<sup>1</sup>                            [id number]                       

                    2017                                                        diverticulosis Dr Hogan                                  [facility]                   

 

 

                          Endoscopy<sup>2, 3</sup>                            [id number]                     

                    2017                                                        Heital hernia

Dr.Harry Samayoa                            [facility]  

                  

 

                          Colonoscopy<sup>2</sup>                            [id number]                       

                    2017                                                        diverticulosis Dr Hogan                                  [facility]                    

 

                          Colonoscopy<sup>2</sup>                            [id number]                       

                    2017                                                        diverticulosis Dr Hogan                                  [facility]                    

 

                          Echocardiogram<sup>4</sup>                            [id number]                    

                    2017                                                        EF 68% Mild MR



Dr Velásquez                              [facility]                    

 

                          Echocardiogram<sup>4</sup>                            [id number]                    

                    2017                                                        EF 68% Mild MR



Dr Velásquez                              [facility]                    

 

                          Echocardiogram<sup>4</sup>                            [id number]                    

                    2017                                                        EF 68% Mild MR



Dr Velásquez                              [facility]               

     

 

                          Echocardiogram<sup>3</sup>                            [id number]                    

                    2017                                                        EF 68% Mild MR



Dr Velásquez                              [facility]                    

 

                          Echocardiogram<sup>3</sup>                            [id number]                    

                    2017                                                        EF 68% Mild MR



Dr Velásquez                              [facility]                    

 

                          Bone density scan<sup>1</sup>                            [id number]                

                    2017                                                        Osteoporosis

 left femoral neck                            [facility]                    

 

                          Bone density scan<sup>5</sup>                            [id number]                

                    2017                                                        Osteoporosis

 left femoral neck                            [facility]                    

 

                          Bone density scan<sup>5</sup>                            [id number]                

                    2017                                                        Osteoporosis

 left femoral neck                            [facility]                    

 

                          Bone density scan<sup>5</sup>                            [id number]                

                    2017                                                        Osteoporosis

 left femoral neck                            [facility]       

             

 

                          Bone density scan<sup>4</sup>                            [id number]                

                    2017                                                        Osteoporosis

 left femoral neck                            [facility]                    

 

                          Bone density scan<sup>4</sup>                            [id number]                

                    2017                                                        Osteoporosis

 left femoral neck                            [facility]                    

 

                    Cholecystectomy                            [id number]                            2016

                                                                                    [facility]                    

 

                    Cholecystectomy                            [id number]                            2016

                                                                                    [facility]                    

 

                    Cholecystectomy                            [id number]                            2016

                                                                                    [facility]                    

 

                          Knee replacement<sup>2</sup>                            [id number]                  

                    2014                                                        left knee  

                                        [facility]                    

 

                          Knee replacement<sup>6</sup>                            [id number]                  

                    2014                                                        left knee  

                                         [facility]                    

 

                          Knee replacement<sup>6</sup>                            [id number]                  

                    2014                                                        left knee  

                                        [facility]                    

 

                          Knee replacement<sup>6</sup>                            [id number]                  

                    2014                                                        left knee  

                                        [facility]                    

 

                          Knee replacement<sup>5</sup>                            [id number]                  

                    2014                                                        left knee  

                                         [facility]                    

 

                          Knee replacement<sup>5</sup>                            [id number]                  

                    2014                                                        left knee  

                                         [facility]                    

 

                    Hysterectomy                            [id number]                                   

                                                                              [facility] [place]

                    

 

                    Hysterectomy                            [id number]                                   

                                                                             [facility]                    

 

                    Hysterectomy                            [id number]                                   

                                                                              [facility]

                    

 

                    [facility]ste[facility]                            [id number]                                   

                                                                              [facility]                    

 

                    Hysterectomy                            [id number]                                   

                                                                              [facility] [place]

                    

 

                    Hysterectomy                            [id number]                                   

                                                                             [facility]

## 2019-06-24 NOTE — XMS REPORT
Summary of Care: 3/3/19 - 3/4/19

                             Created on: 10/26/2117



JR BANUELOS

External Reference #: [id number]

: 1932

Sex: Female



Demographics







                          Address                   [address] [place]  [address]-

 

                          Home Phone                [phone]

 

                          Preferred Language        Unknown

 

                          Marital Status            

 

                          Lutheran Affiliation     Jew

 

                          Race                      Other

 

                                        Additional Race(s)  

 

                          Ethnic Group              Unknown





Author







                          Author                    [facility]

 

                          Organization              [facility]

 

                          Address                   Unknown

 

                          Phone                     Unavailable







Encounter





HQ Wilma_andrei(FIN) [id number] Date(s): 3/3/19 - 3/4/19

[facility] [address], [address] [place] [address]- [phone]





Vital Signs





No data available for this section



Problem List







    



              Condition     Effective Dates     Status       Health Status     Informant

 

    



                           Chronic blood loss        Active  



                                         anemia(Confirmed)    

 

    



                           Oral                      Active  



                                         aphthae(Confirmed)    

 

    



                           Benign                    Active  



                                         hypertension(Confirm    



                                         ed)    

 

    



                           Cholelithiasis(Confi      Active  



                                         rmed)    

 

    



                           Chronic                   Active  



                                         anemia(Confirmed)    

 

    



                           Chronic back              Active  



                                         pain(Confirmed)    

 

    



                           Chronic                   Active  



                                         diarrhea(Confirmed)    

 

    



                           Gastritis(Confirmed)      Active  

 

    



                           Mixed                     Active  



                                         hyperlipidemia(Confi    



                                         rmed)    

 

    



                           Liver                     Active  



                                         cyst(Confirmed)    

 

    



                           Liver                     Active  



                                         mass(Confirmed)    

 

    



                           OA                        Active  



                                         (osteoarthritis)(Con    



                                         firmed)    

 

    



                           Osteoporosis(Confirm      Active  



                                         ed)    

 

    



                           Prediabetes(Confirme      Active  



                                         d)    

 

    



                           Recurrent urinary         Active  



                                         tract    



                                         infection(Confirmed)    

 

    



                           Unstable                  Active  



                                         gait(Confirmed)    

 

    



                           UI (urinary               Active  



                                         incontinence)(Confir    



                                         med)    

 

    



                           Weight                    Active  



                                         loss(Confirmed)    







Allergies, Adverse Reactions, Alerts







   



                 Substance       Reaction        Severity        Status

 

   



                           NKDA                      Active







Medications





No data available for this section



Results





No data available for this section



Immunizations





Given and Recorded





   



                 Vaccine         Date            Status          Refusal Reason

 

   



                     influenza virus vaccine, inactivated     18             Given 

 

   



                     pneumococcal 13-valent vaccine     18             Given 

 

   



                     hepatitis B adult vaccine     16             Given 

 

   



                     hepatitis B adult vaccine     3/10/16             Given 

 

   



                     hepatitis B adult vaccine     16              Given 

 

   



                     pneumococcal 23-valent vaccine     16              Recorded 







Procedures







    



              Procedure     Date         Related Diagnosis     Body Site     Status

 

    



                     Endoscopy1          18             Completed

 

    



                     Colonoscopy2        8/3/17              Completed

 

    



                     Echocardiogram3     17             Completed

 

    



                     Bone density scan4     17             Completed

 

    



                     Cholecystectomy     2016             Completed

 

    



                     Knee replacement2014                Completed

 

    



                           Hysterectomy              Completed







1mild gastritis and duodenitis without any active bleeding. @ [facility]



2diverticulosis  Dr Hogan



3EF 68%  Mild MR

Dr Velásquez



4Osteoporosis left femoral neck



5left knee



Social History







 



                           Social History Type       Response

 

 



                           Alcohol                   Never

 

 



                           Smoking Status            Never smoker; Exposure to Tobacco Smoke None; Cigarette Smoking

 Last 365



                                         Days No; Reg Smoking Cessation Counseling No



                                         entered on: 3/1/19







Assessment and Plan





No data available for this section

## 2019-06-24 NOTE — XMS REPORT
Summary of Care: 18 - 18

                             Created on: 2070



JR BANUELOS

External Reference #: [id number]

: 1932

Sex: Female



Demographics







                          Address                   [address] [place]  [address]-

 

                          Home Phone                [phone]

 

                          Preferred Language        Unknown

 

                          Marital Status            

 

                          Latter day Affiliation     Shinto

 

                          Race                      Other

 

                                        Additional Race(s)  

 

                          Ethnic Group              Unknown





Author







                          Author                    [facility]

 

                          Organization              [facility]

 

                          Address                   Unknown

 

                          Phone                     Unavailable







Encounter





HQ Debra(FIN) [id number] Date(s): 18 - 18

[facility] [address] [address]-     [phone]
[phone]

Discharge Disposition: Home or Self Care

Attending Physician: Jennifer Jain MD





Vital Signs







 



                           Most recent to            1



                                         oldest [Reference 



                                         Range]: 

 

 



                           Height                    149.86 cm



                                         (18 3:58 PM)

 

 



                           Temperature Oral          97.9 DegF



                           [96.4-99.1 DegF]          (18 3:58 PM)

 

 



                           Blood Pressure            168/57 mmHg



                           [/60-90 mmHg]       *HI*



                                         (18 3:58 PM)

 

 



                           Respiratory Rate          14 BRMIN



                           [14-20 BRMIN]             (18 3:58 PM)

 

 



                           Peripheral Pulse          60 bpm



                           Rate [ bpm]         (18 3:58 PM)

 

 



                           Weight                    70 kg



                                         (18 3:58 PM)

 

 



                           Body Mass Index           31.17 m2



                                         (18 3:58 PM)







Problem List







    



              Condition     Effective Dates     Status       Health Status     Informant

 

    



                           Chronic blood loss        Active  



                                         anemia(Confirmed)    

 

    



                           Oral                      Active  



                                         aphthae(Confirmed)    

 

    



                           Benign                    Active  



                                         hypertension(Confirm    



                                         ed)    

 

    



                           Cholelithiasis(Confi      Active  



                                         rmed)    

 

    



                           Chronic                   Active  



                                         anemia(Confirmed)    

 

    



                           Chronic back              Active  



                                         pain(Confirmed)    

 

    



                           Chronic                   Active  



                                         diarrhea(Confirmed)    

 

    



                           Gastritis(Confirmed)      Active  

 

    



                           Mixed                     Active  



                                         hyperlipidemia(Confi    



                                         rmed)    

 

    



                           Liver                     Active  



                                         cyst(Confirmed)    

 

    



                           Liver                     Active  



                                         mass(Confirmed)    

 

    



                           OA                        Active  



                                         (osteoarthritis)(Con    



                                         firmed)    

 

    



                           Osteoporosis(Confirm      Active  



                                         ed)    

 

    



                           Prediabetes(Confirme      Active  



                                         d)    

 

    



                           Recurrent urinary         Active  



                                         tract    



                                         infection(Confirmed)    

 

    



                           Unstable                  Active  



                                         gait(Confirmed)    

 

    



                           UI (urinary               Active  



                                         incontinence)(Confir    



                                         med)    

 

    



                           Weight                    Active  



                                         loss(Confirmed)    







Allergies, Adverse Reactions, Alerts







   



                 Substance       Reaction        Severity        Status

 

   



                           NKDA                      Active







Medications





ferrous sulfate 325 mg oral enteric coated tablet

325 mg=1 tab, PO, Daily, # 90 tab, 0 Refill(s), Pharmacy: [facility] [id number]
[id number]

Start Date: 18

Stop Date: 18

Status: Ordered



Results





No data available for this section



Immunizations





Given and Recorded





   



                 Vaccine         Date            Status          Refusal Reason

 

   



                     influenza virus vaccine, inactivated     18             Given 

 

   



                     pneumococcal 13-valent vaccine     18             Given 

 

   



                     hepatitis B adult vaccine     16             Given 

 

   



                     hepatitis B adult vaccine     3/10/16             Given 

 

   



                     hepatitis B adult vaccine     16              Given 

 

   



                     pneumococcal 23-valent vaccine     16              Recorded 







Procedures







    



              Procedure     Date         Related Diagnosis     Body Site     Status

 

    



                     Endoscopy1          18             Completed

 

    



                     Colonoscopy2        8/3/17              Completed

 

    



                     Echocardiogram3     17             Completed

 

    



                     Bone density scan4     17             Completed

 

    



                     Cholecystectomy     2016             Completed

 

    



                     Knee replacement2014                Completed

 

    



                           Hysterectomy              Completed







1mild gastritis and duodenitis without any active bleeding. @ [facility]



2diverticulosis  Dr Hogan



3EF 68%  Mild MR

Dr Velásquez



4Osteoporosis left femoral neck



5left knee



Social History







 



                           Social History Type       Response

 

 



                           Alcohol                   Never

 

 



                           Smoking Status            Never smoker; Exposure to Tobacco Smoke None; Cigarette Smoking

 Last 365



                                         Days No; Reg Smoking Cessation Counseling No



                                         entered on: 3/1/19







Assessment and Plan





No data available for this section

## 2021-07-24 ENCOUNTER — HOSPITAL ENCOUNTER (EMERGENCY)
Dept: HOSPITAL 88 - FSED | Age: 86
Discharge: HOME | End: 2021-07-24
Payer: COMMERCIAL

## 2021-07-24 VITALS — BODY MASS INDEX: 30.24 KG/M2 | WEIGHT: 150 LBS | HEIGHT: 59 IN

## 2021-07-24 DIAGNOSIS — E86.0: Primary | ICD-10-CM

## 2021-07-24 PROCEDURE — 99284 EMERGENCY DEPT VISIT MOD MDM: CPT

## 2021-07-24 PROCEDURE — 93005 ELECTROCARDIOGRAM TRACING: CPT

## 2021-07-24 PROCEDURE — 81003 URINALYSIS AUTO W/O SCOPE: CPT

## 2021-07-24 PROCEDURE — 74176 CT ABD & PELVIS W/O CONTRAST: CPT

## 2021-07-24 PROCEDURE — 82553 CREATINE MB FRACTION: CPT

## 2021-07-24 PROCEDURE — 80053 COMPREHEN METABOLIC PANEL: CPT

## 2021-07-24 PROCEDURE — 96374 THER/PROPH/DIAG INJ IV PUSH: CPT

## 2021-07-24 PROCEDURE — 85025 COMPLETE CBC W/AUTO DIFF WBC: CPT

## 2021-07-24 PROCEDURE — 84484 ASSAY OF TROPONIN QUANT: CPT
